# Patient Record
Sex: MALE | Race: WHITE | Employment: FULL TIME | URBAN - METROPOLITAN AREA
[De-identification: names, ages, dates, MRNs, and addresses within clinical notes are randomized per-mention and may not be internally consistent; named-entity substitution may affect disease eponyms.]

---

## 2019-05-21 ENCOUNTER — OFFICE VISIT (OUTPATIENT)
Dept: FAMILY MEDICINE CLINIC | Facility: CLINIC | Age: 24
End: 2019-05-21
Payer: COMMERCIAL

## 2019-05-21 ENCOUNTER — HOSPITAL ENCOUNTER (OUTPATIENT)
Dept: ULTRASOUND IMAGING | Facility: HOSPITAL | Age: 24
Discharge: HOME | End: 2019-05-21
Attending: NURSE PRACTITIONER
Payer: COMMERCIAL

## 2019-05-21 ENCOUNTER — LAB ENCOUNTER (OUTPATIENT)
Dept: LAB | Facility: HOSPITAL | Age: 24
End: 2019-05-21
Attending: NURSE PRACTITIONER
Payer: COMMERCIAL

## 2019-05-21 VITALS
DIASTOLIC BLOOD PRESSURE: 56 MMHG | TEMPERATURE: 98 F | OXYGEN SATURATION: 98 % | RESPIRATION RATE: 16 BRPM | HEART RATE: 64 BPM | SYSTOLIC BLOOD PRESSURE: 98 MMHG | HEIGHT: 73 IN | WEIGHT: 167 LBS | BODY MASS INDEX: 22.13 KG/M2

## 2019-05-21 DIAGNOSIS — N50.819 TESTICULAR DISCOMFORT: ICD-10-CM

## 2019-05-21 DIAGNOSIS — R59.9 GLANDS SWOLLEN: ICD-10-CM

## 2019-05-21 DIAGNOSIS — R52 BODY ACHES: ICD-10-CM

## 2019-05-21 DIAGNOSIS — K11.9 PAROTID DISCOMFORT: Primary | ICD-10-CM

## 2019-05-21 DIAGNOSIS — K11.9 PAROTID DISCOMFORT: ICD-10-CM

## 2019-05-21 PROCEDURE — 86735 MUMPS ANTIBODY: CPT

## 2019-05-21 PROCEDURE — 99203 OFFICE O/P NEW LOW 30 MIN: CPT | Performed by: NURSE PRACTITIONER

## 2019-05-21 PROCEDURE — 36415 COLL VENOUS BLD VENIPUNCTURE: CPT

## 2019-05-21 PROCEDURE — 93975 VASCULAR STUDY: CPT | Performed by: NURSE PRACTITIONER

## 2019-05-21 PROCEDURE — 76870 US EXAM SCROTUM: CPT | Performed by: NURSE PRACTITIONER

## 2019-05-21 PROCEDURE — 85025 COMPLETE CBC W/AUTO DIFF WBC: CPT

## 2019-05-21 NOTE — PROGRESS NOTES
Outgoing call to Melissa Shi, concerning pre-certification for US Scrotum.   Per Seth Degree no authorization is needed for this test,   Tracking number [id number]

## 2019-05-21 NOTE — PROGRESS NOTES
Gildardo Riley is a 26year old male. HPI:   Patient presents today to re-establish care- patient last seen in our office in 2014.  Patient reporting a 3 day history of body aches, chills, pain to his bilateral jaw- more when eating and noticing some swe 98/56   Pulse 64   Temp 98 °F (36.7 °C) (Oral)   Resp 16   Ht 73\"   Wt 167 lb   SpO2 98%   BMI 22.03 kg/m²   GENERAL: well developed, well nourished,in no apparent distress  SKIN: no rashes,no suspicious lesions  HEENT: TM clear bilaterally, nose no conge swollen  - MUMPS ANTIBODIES, IGM; Future  - MUMPS RNA (PCR),QUALITATIVE,ACUTE INFECTION; Future  - CBC WITH DIFFERENTIAL WITH PLATELET; Future    3. Testicular discomfort  STAT testicular US ordered. Ok for OTC Ibuprofen 600 mg TID with food.   - MUMPS ANT

## 2019-05-22 ENCOUNTER — APPOINTMENT (OUTPATIENT)
Dept: LAB | Facility: HOSPITAL | Age: 24
End: 2019-05-22
Attending: NURSE PRACTITIONER
Payer: COMMERCIAL

## 2019-05-22 ENCOUNTER — TELEPHONE (OUTPATIENT)
Dept: FAMILY MEDICINE CLINIC | Facility: CLINIC | Age: 24
End: 2019-05-22

## 2019-05-22 DIAGNOSIS — K11.9 PAROTID DISCOMFORT: ICD-10-CM

## 2019-05-22 DIAGNOSIS — R52 BODY ACHES: ICD-10-CM

## 2019-05-22 DIAGNOSIS — R59.9 GLANDS SWOLLEN: ICD-10-CM

## 2019-05-22 PROCEDURE — 87798 DETECT AGENT NOS DNA AMP: CPT

## 2019-05-22 NOTE — TELEPHONE ENCOUNTER
Per Latha Gilliland, pt is to have the Mumps by PCR buccal swab test re-done today. According to Jona Kinney at [facility] lab they used the wrong tube for the collection done at the out patient lab yesterday.   Called pt to advise him that he needs to re-do the te

## 2019-05-28 ENCOUNTER — TELEPHONE (OUTPATIENT)
Dept: FAMILY MEDICINE CLINIC | Facility: CLINIC | Age: 24
End: 2019-05-28

## 2019-05-28 NOTE — TELEPHONE ENCOUNTER
Pt's Mom called and states she is calling on behalf of pt regarding on what to do with his testicular pain. States pt is still with tenderness on the area, not worse or better. Pt wants to know what to do.     Spoke with Nubia Britton and she advise to d/

## 2020-03-18 ENCOUNTER — TELEPHONE (OUTPATIENT)
Dept: FAMILY MEDICINE CLINIC | Facility: CLINIC | Age: 25
End: 2020-03-18

## 2020-03-18 NOTE — TELEPHONE ENCOUNTER
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

## 2020-03-18 NOTE — TELEPHONE ENCOUNTER
Does he still have symptoms of any type or have his symptoms completely resolved? What type of work does he do?

## 2020-03-18 NOTE — TELEPHONE ENCOUNTER
If he still has cold symptoms, would recommend he stay home until symptoms are better. Once resolved, he we can write a note explaining to employer that his symptoms have resolved by his report and that he didn't meet criteria for Covid testing.

## 2020-03-18 NOTE — TELEPHONE ENCOUNTER
What symptoms is the patient experiencing?: stayed home from work past 2 days for cough and sniffles. States no fever. His work is requesting a note to return to work.       Has the patient traveled to an effected geographic area ([place], [place], [facility], [facility]

## 2020-03-20 NOTE — TELEPHONE ENCOUNTER
Pt states his symptoms are gone and he needs a note to go back to work. He said a note sent to my chart would be fine.

## 2021-11-27 ENCOUNTER — HOSPITAL ENCOUNTER (INPATIENT)
Facility: HOSPITAL | Age: 26
LOS: 5 days | Discharge: HOME | DRG: 378 | End: 2021-12-02
Attending: INTERNAL MEDICINE | Admitting: INTERNAL MEDICINE

## 2021-11-27 ENCOUNTER — APPOINTMENT (OUTPATIENT)
Dept: MRI IMAGING | Facility: HOSPITAL | Age: 26
DRG: 378 | End: 2021-11-27
Attending: INTERNAL MEDICINE

## 2021-11-27 DIAGNOSIS — K92.1 MELENA: ICD-10-CM

## 2021-11-27 PROBLEM — K52.9 GASTROENTERITIS: Status: ACTIVE | Noted: 2021-11-27

## 2021-11-27 PROCEDURE — 74181 MRI ABDOMEN W/O CONTRAST: CPT | Performed by: INTERNAL MEDICINE

## 2021-11-27 PROCEDURE — 76376 3D RENDER W/INTRP POSTPROCES: CPT | Performed by: INTERNAL MEDICINE

## 2021-11-27 RX ORDER — POTASSIUM CHLORIDE 20 MEQ/1
20 TABLET, EXTENDED RELEASE ORAL ONCE
Status: COMPLETED | OUTPATIENT
Start: 2021-11-27 | End: 2021-11-27

## 2021-11-27 RX ORDER — SODIUM CHLORIDE 9 MG/ML
INJECTION, SOLUTION INTRAVENOUS CONTINUOUS
Status: ACTIVE | OUTPATIENT
Start: 2021-11-27 | End: 2021-11-30

## 2021-11-27 RX ORDER — ONDANSETRON 2 MG/ML
4 INJECTION INTRAMUSCULAR; INTRAVENOUS EVERY 6 HOURS PRN
Status: DISCONTINUED | OUTPATIENT
Start: 2021-11-27 | End: 2021-12-02

## 2021-11-27 RX ORDER — PROCHLORPERAZINE EDISYLATE 5 MG/ML
5 INJECTION INTRAMUSCULAR; INTRAVENOUS EVERY 8 HOURS PRN
Status: DISCONTINUED | OUTPATIENT
Start: 2021-11-27 | End: 2021-12-02

## 2021-11-27 RX ORDER — ACETAMINOPHEN 325 MG/1
650 TABLET ORAL EVERY 6 HOURS PRN
Status: DISCONTINUED | OUTPATIENT
Start: 2021-11-27 | End: 2021-12-02

## 2021-11-27 RX ORDER — POTASSIUM CHLORIDE 20 MEQ/1
40 TABLET, EXTENDED RELEASE ORAL ONCE
Status: COMPLETED | OUTPATIENT
Start: 2021-11-27 | End: 2021-11-27

## 2021-11-27 NOTE — CONSULTS
[facility]    Report of Consultation    Drake Latham Patient Status:  Inpatient    1995 MRN [id number]   [facility] 3NW-A Attending Rosa Richter MD   Hosp Day # 0 PCP Connie Roque MD     Date of consult: 20 in the 24 hours ending 11/27/21 1633  Wt Readings from Last 3 Encounters:  11/27/21 : 170 lb (77.1 kg)  05/21/19 : 167 lb (75.8 kg)  06/12/14 : 142 lb (64.4 kg) (42 %, Z= -0.21)*    * Growth percentiles are based on CDC (Boys, 2-20 Years) data.     General: 11/27/2021    BACUR 1+ (A) 11/27/2021         IMAGING:     All imaging studies personally reviewed. CT ABDOMEN+PELVIS(CONTRAST ONLY)(CPT=74177)    Result Date: 11/27/2021  IMPRESSION: 1.  Probable accessory splenule as described above with an otherwise u

## 2021-11-27 NOTE — PROGRESS NOTES
1520: Assumed care of pt. Denies any CP, BASILIO, or calf pain at present. Sclera appears jaundiced. Lungs clear bilaterally. Abdomen soft, tender, nondistended. Bowel sounds present. Pt states mild nausea at this time, able to tolerate water at this time.  Voi

## 2021-11-27 NOTE — PLAN OF CARE
NURSING ADMISSION NOTE      Patient admitted via Wheelchair  Oriented to room. Safety precautions initiated. Bed in low position. Call light in reach.     Mother and pt updated on plan of care

## 2021-11-27 NOTE — PROGRESS NOTES
Consults called by dr. Simona Zuniga.  Nephrology, hemonc, and GI    Voided 725, pvr 466 (dr Cody Mahoney aware, recheck next void)

## 2021-11-27 NOTE — H&P
[facility] Hospitalist History and Physical      Chief Complaint:  N/V and diarrhea    PCP: Vesta Go MD      History of Present Illness: Patient is a 23year old male healthy male who presents w 6 day dureation of N/V and diarrhea.       Sx started Loli rhythm, S1, S2 normal, no murmur, rub or gallop appreciated   Abdomen:   Soft, non-tender. Bowel sounds normal. No masses,  No organomegaly. Non distended   Extremities: Extremities normal, atraumatic, no cyanosis or edema.    Skin: Skin color, texture, tur contrast.        Assessment/Plan:     # N/V/diarrhea  - suspect viral.  CT abd without acute process. Send GI PCR and culture. Fully vaccinated against covid and neg rapid  - IVF, CLD    # PATIENCE  - suspect pre-renal + NSAID effect.   Given concurrent thromb

## 2021-11-28 RX ORDER — POTASSIUM CHLORIDE 20 MEQ/1
40 TABLET, EXTENDED RELEASE ORAL ONCE
Status: COMPLETED | OUTPATIENT
Start: 2021-11-29 | End: 2021-11-29

## 2021-11-28 RX ORDER — POTASSIUM CHLORIDE 20 MEQ/1
40 TABLET, EXTENDED RELEASE ORAL ONCE
Status: COMPLETED | OUTPATIENT
Start: 2021-11-28 | End: 2021-11-28

## 2021-11-28 RX ORDER — POTASSIUM CHLORIDE 20 MEQ/1
40 TABLET, EXTENDED RELEASE ORAL ONCE
Status: DISCONTINUED | OUTPATIENT
Start: 2021-11-28 | End: 2021-11-28

## 2021-11-28 NOTE — PROGRESS NOTES
[facility]    Progress Note    Lucio Madden Patient Status:  Inpatient    1995 MRN [id number]   [facility] Attending Miranda Olivarez MD   [facility] Day # 1 PCP Iam Wheeler MD     Subjective:  Lucio Madden is negative.     Ecoli with HUS or TTP could be possible. But with haptoglobin and LDH elevations, this does not suggest a hemolytic picture. The good news from a liver standpoint is that his mental status is intact and his INR is normal at 1.   This all poi

## 2021-11-28 NOTE — PLAN OF CARE
A&O x4. Denies any CP, BASILIO, or calf pain at present. Sclera appears jaundiced. Lungs clear bilaterally. ST on tele. Abdomen soft, mildly tender, nondistended. Bowel sounds hypoactive but present. Pt reports flatus.  Pt reports some mild nausea but declined Progressing     Problem: DISCHARGE PLANNING  Goal: Discharge to home or other facility with appropriate resources  Description: INTERVENTIONS:  - Identify barriers to discharge w/pt and caregiver  - Include patient/family/discharge partner in discharge shanti

## 2021-11-28 NOTE — CONSULTS
Hem Onc:Consult Note          SUBJECTIVE:     Reason for Consultation: Thrombocytopenia    History of Present Illness:  Patient is a 23year old, male who presented with complaints of nausea/vomiting/diarrhea, ongoing x 6 days.  States he noted a cold init adenopathy palpable  Heart: regular rate  Lungs: clear to auscultation, no wheezing  Abdomen: soft, non-tender, non-distended  Extremity: no LE edema  Skin: No rashes or lesions  Lymph nodes: Cervical, supraclavicular,  Axillary, and inguinal nodes not pal further evaluation as clinically indicated.           Dictated by (CST): Rios Gabriel MD on 11/27/2021 at 5:00 PM       Finalized by (CST): Rios Gabriel MD on 11/27/2021 at 5:04 PM              Lab Results:   HEM:  Lab Results   Component Value Date    WBC 9.1 plan  9.  Case discussed with Dr. Jaime Angeles and Dr Rufina Ramírez

## 2021-11-28 NOTE — PROGRESS NOTES
[facility]    Nephrology Progress Note    Luis Boykin Attending:  Lo Cheung MD     Cc: PATIENCE    SUBJECTIVE     Feels a little better, n/v improved, no further diarrhea    PHYSICAL EXAM   Vital signs: /72 (BP Location: Left arm) PTP 13.5 11/27/2021    T4F 1.5 11/27/2021    TSH 0.246 11/27/2021    ESRML 48 11/27/2021    CRP 13.80 11/27/2021    MG 2.4 11/28/2021    PHOS 1.7 11/28/2021     11/28/2021       IMAGING   All imaging studies personally reviewed.     MRI ABDOMEN&MRCP W sonogram can be performed for further     evaluation as clinically indicated.               Dictated by (CST): Dipti Salinas MD on 11/27/2021 at 5:00 PM         Finalized by (CST): Dipti Salinas MD on 11/27/2021 at 5:04 PM        Murali Calvin high.   -- MRCP neg for biliary obstruction.  Plan for CARLOS      D/w Dr Keith Wu, Dr Em Hughes, Dr Lety Luna  D/w RN    Approximally > 35min of non-face to face prolonged service relating to on-going patient management as discussed above      Thank you for allowin

## 2021-11-28 NOTE — PROGRESS NOTES
[facility]     Hospitalist Progress Note     Rose Marie Lamas Patient Status:  Inpatient    1995 MRN [id number]   [facility] 3NW-A Attending Christiano Keating MD   Hosp Day # 1 PCP Batsheva Stuart MD     Chief Complaint: N/V 74  --    AST 56* 59*  --   --   --   --  55*  --    ALT 39 40  --   --   --   --  39  --    BILT 8.54* 7.9*  --   --   --   --  7.8*  --    TP 6.3* 6.4  --   --   --   --  5.5*  --     < > = values in this interval not displayed.        CrCl cannot be calc subclinical hyperthyroidism  - repeat TFTs 4 weeks    # proteinuria  - work up as per renal    # elevated CK  - from suspected virus + dehydration + nsaids  - improving w IVF  .       Plan of care discussed with Dr Bertha Kim, Dr Mirella Andrade and Dr Pascale Sandy as well as p

## 2021-11-28 NOTE — CONSULTS
[facility]    Report of E-Consultation    George Heller Patient Status:  Inpatient    1995 MRN [id number]   [facility] 3NW-A Attending Alix Cortes MD   Hosp Day # 0 PCP Stephanie Munroe MD     Date of Admission:  1 mg, 4 mg, Intravenous, Q6H PRN  •  Prochlorperazine Edisylate (COMPAZINE) injection 5 mg, 5 mg, Intravenous, Q8H PRN    Review of Systems:  GENERAL: feels well otherwise  SKIN: neg  EYES: neg  HEENT: neg  LUNGS: neg  CARDIOVASCULAR: neg  GI: as above  NEUR has a very high indirect bilirubinemia with low platelets. We are waiting on the direct bilirubin. If direct bilirubin is normal, then could he have a severe Gilbert's syndrome worsened by a viral picture and malnutrition.   [place] can reach isolated in

## 2021-11-28 NOTE — PLAN OF CARE
A&Ox4. VSS. RA. . Sclera appear slightly jaundiced. Telemetry: ST  GI: Abdomen soft, nondistended. Hypoactive, not passing gas. Some tenderness in abd  : Voids.   Pain controlled with PRN pain medications  Up with standby assist.  Diet: clears, pt to appropriate resources  Description: INTERVENTIONS:  - Identify barriers to discharge w/pt and caregiver  - Include patient/family/discharge partner in discharge planning  - Arrange for needed discharge resources and transportation as appropriate  - Identif

## 2021-11-29 ENCOUNTER — ANESTHESIA (OUTPATIENT)
Dept: ENDOSCOPY | Facility: HOSPITAL | Age: 26
DRG: 378 | End: 2021-11-29

## 2021-11-29 ENCOUNTER — ANESTHESIA EVENT (OUTPATIENT)
Dept: ENDOSCOPY | Facility: HOSPITAL | Age: 26
DRG: 378 | End: 2021-11-29

## 2021-11-29 ENCOUNTER — APPOINTMENT (OUTPATIENT)
Dept: NUCLEAR MEDICINE | Facility: HOSPITAL | Age: 26
DRG: 378 | End: 2021-11-29
Attending: INTERNAL MEDICINE

## 2021-11-29 PROCEDURE — 78226 HEPATOBILIARY SYSTEM IMAGING: CPT | Performed by: INTERNAL MEDICINE

## 2021-11-29 PROCEDURE — 99291 CRITICAL CARE FIRST HOUR: CPT | Performed by: NURSE PRACTITIONER

## 2021-11-29 PROCEDURE — 78227 HEPATOBIL SYST IMAGE W/DRUG: CPT | Performed by: INTERNAL MEDICINE

## 2021-11-29 PROCEDURE — 0DB68ZX EXCISION OF STOMACH, VIA NATURAL OR ARTIFICIAL OPENING ENDOSCOPIC, DIAGNOSTIC: ICD-10-PCS | Performed by: INTERNAL MEDICINE

## 2021-11-29 PROCEDURE — 0W3P8ZZ CONTROL BLEEDING IN GASTROINTESTINAL TRACT, VIA NATURAL OR ARTIFICIAL OPENING ENDOSCOPIC: ICD-10-PCS | Performed by: INTERNAL MEDICINE

## 2021-11-29 RX ORDER — ONDANSETRON 2 MG/ML
4 INJECTION INTRAMUSCULAR; INTRAVENOUS AS NEEDED
Status: ACTIVE | OUTPATIENT
Start: 2021-11-29 | End: 2021-11-30

## 2021-11-29 RX ORDER — CHOLECALCIFEROL (VITAMIN D3) 125 MCG
2000 CAPSULE ORAL DAILY
Status: DISCONTINUED | OUTPATIENT
Start: 2021-11-30 | End: 2021-12-02

## 2021-11-29 RX ORDER — LIDOCAINE HYDROCHLORIDE 10 MG/ML
INJECTION, SOLUTION EPIDURAL; INFILTRATION; INTRACAUDAL; PERINEURAL AS NEEDED
Status: DISCONTINUED | OUTPATIENT
Start: 2021-11-29 | End: 2021-11-29 | Stop reason: SURG

## 2021-11-29 RX ORDER — SODIUM CHLORIDE 9 MG/ML
INJECTION, SOLUTION INTRAVENOUS CONTINUOUS
Status: DISCONTINUED | OUTPATIENT
Start: 2021-11-29 | End: 2021-12-01

## 2021-11-29 RX ORDER — MAGNESIUM SULFATE HEPTAHYDRATE 40 MG/ML
2 INJECTION, SOLUTION INTRAVENOUS ONCE
Status: COMPLETED | OUTPATIENT
Start: 2021-11-29 | End: 2021-11-29

## 2021-11-29 RX ORDER — NALOXONE HYDROCHLORIDE 0.4 MG/ML
80 INJECTION, SOLUTION INTRAMUSCULAR; INTRAVENOUS; SUBCUTANEOUS AS NEEDED
Status: ACTIVE | OUTPATIENT
Start: 2021-11-29 | End: 2021-11-30

## 2021-11-29 RX ORDER — SUCRALFATE ORAL 1 G/10ML
1 SUSPENSION ORAL 3 TIMES DAILY
Status: DISCONTINUED | OUTPATIENT
Start: 2021-11-30 | End: 2021-12-02

## 2021-11-29 RX ADMIN — SODIUM CHLORIDE: 9 INJECTION, SOLUTION INTRAVENOUS at 19:42:00

## 2021-11-29 RX ADMIN — LIDOCAINE HYDROCHLORIDE 100 MG: 10 INJECTION, SOLUTION EPIDURAL; INFILTRATION; INTRACAUDAL; PERINEURAL at 19:44:00

## 2021-11-29 NOTE — PLAN OF CARE
A&Ox4. VSS. RA. . Sclera appear slightly jaundiced. Telemetry: NSR and ST at times  GI: Abdomen soft, nondistended. Hypoactive, passing some gas. Some tenderness in abd  : Voids. Pain controlled with PRN pain medications  Up ad parvez.   Diet: regular PLANNING  Goal: Discharge to home or other facility with appropriate resources  Description: INTERVENTIONS:  - Identify barriers to discharge w/pt and caregiver  - Include patient/family/discharge partner in discharge planning  - Arrange for needed dischar

## 2021-11-29 NOTE — CM/SW NOTE
Pt seen for self-pay status. Pt is self-pay at this time. Pt has a new job and applied for insurance which will not start until January of 2022.   Per pt's mother, pt was covered under their insurance but then was taken off when pt got his new job but the

## 2021-11-29 NOTE — PROGRESS NOTES
[facility]     Hospitalist Progress Note     Lucio Madden Patient Status:  Inpatient    1995 MRN [id number]   [facility] 3NW-A Attending Miranda Olivarez MD   Hosp Day # 2 PCP Iam Wheeler MD     Chief Complaint: N/V --  39  --   --   --  39   BILT 7.9*  --  7.8*  --   --   --  6.9*   TP 6.4  --  5.5*  --   --   --  4.6*    < > = values in this interval not displayed. CrCl cannot be calculated (Unknown ideal weight. ). Recent Labs   Lab 11/27/21  1455   PTP 13. follow w hydration     # low TSH, normal ft4   - consistent w subclinical hyperthyroidism  - repeat TFTs 4 weeks    # proteinuria  - work up as per renal    # elevated CK-r esolved  - from suspected virus + dehydration + nsaids  - improving w IVF  .       P

## 2021-11-29 NOTE — PROGRESS NOTES
[facility]  Progress Note    Nikki Flores Patient Status:  Inpatient    1995 MRN [id number]   [facility] 3NW-A Attending Marthenia Goodell, [facility] Gela [facility]     Subjective:  Pt had bout of coffee g 7.6 11/29/2021    ALB 1.6 11/29/2021    ALKPHO 61 11/29/2021    BILT 6.9 11/29/2021    TP 4.6 11/29/2021    AST 48 11/29/2021    ALT 39 11/29/2021    MG 1.5 11/29/2021    PHOS 1.9 11/29/2021       Assessment:     Diarrhea, nausea and vomiting  Elevated tbi

## 2021-11-29 NOTE — PROGRESS NOTES
[facility]  [facility]  Progress Note    Nikki Flores Patient Status:  Inpatient    1995 MRN [id number]   [facility] 3NW-A Attending Marthenia Goodell, MD   Hosp Day # 2 PCP Gela Jensen MD     A mmol/L    Anion Gap 9 0 - 18 mmol/L    BUN 51 (H) 7 - 18 mg/dL    Creatinine 1.81 (H) 0.70 - 1.30 mg/dL    Calcium, Total 8.3 (L) 8.5 - 10.1 mg/dL    Calculated Osmolality 288 275 - 295 mOsm/kg    GFR, Non- 51 (L) >=60    GFR, -Ameri Urine 2.0 (A) <2.0 mg/dL    Nitrite Urine Negative Negative    Leukocyte Esterase Urine Negative Negative    WBC Urine 1-5 0 - 5 /HPF    RBC Urine 0-2 0 - 2 /HPF    Bacteria Urine Rare (A) None Seen /HPF    Squamous Epi.  Cells None Seen None Seen /HPF    R - 18 mmol/L    BUN 40 (H) 7 - 18 mg/dL    Creatinine 1.51 (H) 0.70 - 1.30 mg/dL    Calcium, Total 8.1 (L) 8.5 - 10.1 mg/dL    Calculated Osmolality 286 275 - 295 mOsm/kg    GFR, Non- 63 >=60    GFR, -American 73 >=60    Albumin 1.7 ( 0. 04 0.00 - 0.20 x10(3) uL    Immature Granulocyte Absolute 0.35 0.00 - 1.00 x10(3) uL    Neutrophil % 72.8 %    Lymphocyte % 12.7 %    Monocyte % 9.2 %    Eosinophil % 1.1 %    Basophil % 0.4 %    Immature Granulocyte % 3.8 %   Scan slide    Collection Ti FERRITIN      38.0 - 270.0 ng/mL 552.6 (H)       CULTURES  No results found for this visit on 11/27/21. IMAGING:    No results found. MEDICATIONS:  Medications reviewed.     • pantoprazole (PROTONIX) IV push  40 mg Intravenous BID         ASSESSME

## 2021-11-29 NOTE — PLAN OF CARE
Patient is alert and oriented x3  Up ad parvez  Voiding  NPO status maintained for HIDA scan and EGD. Patient has an appetite. Passing Gas   Intermittent nausea today; emesis x1 overnight (coffee ground in color; per night RN). Chest burning; PPI per MAR.  E mobilization and activity  - Obtain nutritional consult as needed  - Establish a toileting routine/schedule  - Consider collaborating with pharmacy to review patient's medication profile  Outcome: Progressing        Problem: PAIN - ADULT  Goal: Verbalizes/

## 2021-11-29 NOTE — PROGRESS NOTES
[facility]    Nephrology Progress Note    Anuj Loza Attending:  Dov Henning MD       SUBJECTIVE:     Had some burning in his chest earlier this morning. GI notes reviewed - also had some coffee ground emesis per notes.   Urinating kang 11/29/2021    RDW 13.2 11/29/2021    NEPRELIM 6.71 11/29/2021    NEPERCENT 72.8 11/29/2021    LYPERCENT 12.7 11/29/2021    MOPERCENT 9.2 11/29/2021    EOPERCENT 1.1 11/29/2021    BAPERCENT 0.4 11/29/2021    NE 6.71 11/29/2021    LYMABS 1.17 11/29/2021    M hepatitis panel  -- Pending: cryo, SPEP, UTOX, bence jones  -- will repeat UPCR since Cr improved    Hyponatremia:  -- improved with IV fluids    Hypokalemia, hypomagnesemia, hypophosphatemia:  -- Kphos, magnesium sulfate replacement ordered  -- decrease I

## 2021-11-30 RX ORDER — DIPHENHYDRAMINE HCL 25 MG
25 CAPSULE ORAL EVERY 6 HOURS PRN
Status: DISCONTINUED | OUTPATIENT
Start: 2021-11-30 | End: 2021-12-02

## 2021-11-30 RX ORDER — POTASSIUM CHLORIDE 20 MEQ/1
40 TABLET, EXTENDED RELEASE ORAL ONCE
Status: COMPLETED | OUTPATIENT
Start: 2021-11-30 | End: 2021-11-30

## 2021-11-30 NOTE — BRIEF OP NOTE
Pre-Operative Diagnosis: melena     Post-Operative Diagnosis: multiple gastric ulcers x1 bleeding, esophagitis      Procedure Performed:   ESOPHAGOGASTRODUODENOSCOPY (EGD)    Surgeon(s) and Role:     * Rome Banuelos MD - Primary    Assistant(s):

## 2021-11-30 NOTE — PLAN OF CARE
Received patient around 2100. Patient transferred over to unit. Received patient on room air. Patient denies pain. Calcium gluconate delivered to unit and administered. Protonix bolus + drip initiated per orders. Urinal at bedside.  Updated patient and fami

## 2021-11-30 NOTE — PROGRESS NOTES
[facility]  Progress Note    Wilmer Cheney Patient Status:  Inpatient    1995 MRN [id number]   [facility] 4SW-A Attending Sonja Arenas MD   [facility] Day # 3 PCP Radha Jasso MD     Subjective:  No further GI bleeding 11/30/2021    CA 7.6 11/30/2021    ALB 1.7 11/30/2021    ALKPHO 63 11/30/2021    BILT 6.9 11/30/2021    TP 4.5 11/30/2021    AST 50 11/30/2021    ALT 44 11/30/2021    MG 1.2 11/30/2021    PHOS 2.8 11/30/2021     NM GB HEPATOBILIARY SCAN (CPT=78226) [[id number] unclear etiology - suspect Gilbert's syndrome in setting of prolonged fasting and elevated renal excretion of bilirubin. Hx of heavy etoh use on weekends with daily etoh use in college. Work up for underlying liver diseases thus far negative.    Platelets

## 2021-11-30 NOTE — PLAN OF CARE
Patient continuously monitored on telemetry. Medications given per [facility], tolerating PO medication and regular diet with no complications. Patient and family updated with Plan of Care and education given as needed.   Please see EPIC DocFlowsheet for further

## 2021-11-30 NOTE — PROGRESS NOTES
ICU  Critical Care APRN Progress Note    NAME: Angella Braden - ROOM: 468 - MRN: [id number] - Age: 23year old - :1995    History Of Present Illness:  Angella Braden is a 23year old male with no significant PMHx admitted to ICU post EGD.   He juanjo sec.    Pulses: 2+ and symmetric all extremities  Skin: Skin color, texture, turgor normal for ethnicity, no rashes or lesions, warm and dry  Neurologic: CNII-XII intact, normal strength    Data this admission:  CT ABDOMEN+PELVIS(CONTRAST ONLY)(CPT=74177) samples sent    2. PATIENCE- Cr improving, good UOP  -Viral and hepatitis testing negative so far  -strict I&O  -IVF  -Renal following    3. Thrombocytopenia  -Improving  -Thought to be reactive to alcohol or infection  -Heme following    4.  Hyperbilirubinemia-

## 2021-11-30 NOTE — ANESTHESIA POSTPROCEDURE EVALUATION
[address] Patient Status:  Inpatient   Age/Gender 23year old male MRN [id number]   Location [id number] [place] [address] Attending Mayuri Oden MD   [facility] Day # 2 PCP Sher Mcclelland MD       Anesthesia Post-op

## 2021-11-30 NOTE — CONSULTS
HPI: Alverto Schaffer is a 23year old male with no significant history who presented to the ER 11/27 with c/o fever, abdominal pain, vomiting, and diarrhea. He was found to have PATIENCE, thrombocytopenia, and abnormal liver function tests.  Yesterday he Asked        Stress Concern: Not Asked        Weight Concern: Not Asked        Special Diet: Not Asked        Back Care: Not Asked        Exercise: Yes          daily        Bike Helmet: Not Asked        Seat Belt: Not Asked        Self-Exams: Not Asked Labs   Lab 11/27/21  0838 11/27/21  1455 11/28/21  0748 11/28/21  1145 11/28/21  1953 11/29/21  0628 11/30/21  0447   *   < > 132*  132*   < > 133* 137 137   K 2.81*   < > 3.1*  3.1*   < > 2.7* 3.3* 3.4*   CL 84*   < > 99  99   < > 98 103 103   CO2 2 plt  6. Nutrition - clear liquid diet, advance as tolerated  7. Proph - SCD, PPI  8. Dispo   -full code  -ICU - transfer to floor when ok with GI    We will follow while the patient is in the ICU, then as needed. Steve Honeycutt M.D.   Pulmonary/Criti

## 2021-11-30 NOTE — OPERATIVE REPORT
PATIENT NAME: Shavon Guevara  MRN: [id number]  DATE OF OPERATION:  11/29/21  REFERRING PHYSICIAN: Dr. Ferny Anthony  Medications:  MAC  PREOPERATIVE DIAGNOSIS    Melena   Coffee ground emesis  POSTOPERATIVE DIAGNOSIS:  1. LA grade C esophagitis with non-bridg The procedure was completed. The patient tolerated the procedure well. RECOMMENDATIONS   1. Protonix 80 mg IV bolus to be followed by 8 mg iv per hour drip. 2. Carafate 1 gm po tid. 3. Clear liquid diet. 4. Transfer pt to ICU for close monitoring.   5

## 2021-11-30 NOTE — ANESTHESIA PREPROCEDURE EVALUATION
PRE-OP EVALUATION    Patient Name: Junior Mckeon    Admit Diagnosis: PATIENCE/DEHYDRATION/HYPONATREMIA  Gastroenteritis    Pre-op Diagnosis: melena    ESOPHAGOGASTRODUODENOSCOPY (EGD)    Anesthesia Procedure: ESOPHAGOGASTRODUODENOSCOPY (EGD) (N/A )    Mary May chloride (K-DUR M20) CR tab 40 mEq, 40 mEq, Oral, Once        Outpatient Medications:   No medications prior to admission. Allergies: Patient has no known allergies. Anesthesia Evaluation    Patient summary reviewed.     Anesthetic Complications Component Value Date    INR 1.01 11/27/2021         Airway      Mallampati: II  Mouth opening: >3 FB  TM distance: 4 - 6 cm  Neck ROM: full Cardiovascular      Rhythm: regular       Dental             Pulmonary      Breath sounds clear to auscultation bi

## 2021-11-30 NOTE — PROGRESS NOTES
[facility]    Nephrology Progress Note    Gildardo Riley Attending:  Mayuri Oden MD       SUBJECTIVE:     EGD with gastric ulcers and esophagitis. Feels a little better today. No urinary complaints or leg swelling.   Tolerating oral intak NEPRELIM 6.83 11/30/2021    NEPERCENT 64.3 11/30/2021    LYPERCENT 19.7 11/30/2021    MOPERCENT 9.4 11/30/2021    EOPERCENT 2.1 11/30/2021    BAPERCENT 0.6 11/30/2021    NE 6.83 11/30/2021    LYMABS 2.09 11/30/2021    MOABSO 1.00 11/30/2021    EOABSO 0.22 I/Os     Proteinuria: UPCR 2.59 g/g, UACR 1.4 g/g, UA 11/28 bland  -- negative: resp viral panel, covid, PLA2R, HIV, C3, C4, dsDNA, ROHINI, CMV IgM, EBV, acute hepatitis panel  -- Pending: cryo, SPEP, UTOX, bence jones  -- will repeat UPCR since Cr improved

## 2021-12-01 RX ORDER — MAGNESIUM SULFATE HEPTAHYDRATE 40 MG/ML
2 INJECTION, SOLUTION INTRAVENOUS ONCE
Status: COMPLETED | OUTPATIENT
Start: 2021-12-01 | End: 2021-12-01

## 2021-12-01 RX ORDER — MAGNESIUM OXIDE 400 MG (241.3 MG MAGNESIUM) TABLET
800 TABLET ONCE
Status: DISCONTINUED | OUTPATIENT
Start: 2021-12-01 | End: 2021-12-01

## 2021-12-01 RX ORDER — PANTOPRAZOLE SODIUM 40 MG/1
40 TABLET, DELAYED RELEASE ORAL
Status: DISCONTINUED | OUTPATIENT
Start: 2021-12-02 | End: 2021-12-02

## 2021-12-01 NOTE — PROGRESS NOTES
[facility]  Progress Note    Gildardo Riley Patient Status:  Inpatient    1995 MRN [id number]   [facility] 4SW-A Attending Mayuri Oden MD   [facility] Day # 4 PCP Sher Mcclelland MD     Subjective: Tolerating diet.   No fu BUN 11 12/01/2021     12/01/2021    K 3.5 12/01/2021     12/01/2021    CO2 28.0 12/01/2021    GLU 93 12/01/2021    CA 7.8 12/01/2021    ALB 1.8 12/01/2021    ALKPHO 79 12/01/2021    BILT 6.9 12/01/2021    TP 4.9 12/01/2021    AST 56 12/01/2021

## 2021-12-01 NOTE — PROGRESS NOTES
[facility]    Nephrology Progress Note    Nick Amaya Attending:  Virgilio Muniz MD       SUBJECTIVE:     No urinary complaints, cp, sob, leg swelling. Eating ok. PHYSICAL EXAM:     Vital Signs: /61   Pulse 79   Temp 98.9 °F (37. 12/01/2021    NEUT 82 12/01/2021    LYMPH 13 12/01/2021    MON 4 12/01/2021    EOS 1 12/01/2021    BASO 0 12/01/2021    NEPERCENT 64.3 11/30/2021    LYPERCENT 19.7 11/30/2021    MOPERCENT 9.4 11/30/2021    EOPERCENT 2.1 11/30/2021    BAPERCENT 0.6 11/30/20 pre-renal component  -- creatinine improved to 0.76 mg/dl.  hold further IV fluids and encourage oral intake  -- avoid NSAIDs, IV contrast, fleets  -- strict I/Os     Proteinuria: UPCR 2.59 g/g, UACR 1.4 g/g, UA 11/28 bland  -- negative: resp viral panel, c

## 2021-12-01 NOTE — PROGRESS NOTES
Received AO x4. On RA maintaining sats. SR on the monitor BP stable. On Protonix drip. No bleeding, no bloody stool overnight. Ambulated to the bathroom multiple times. Voided freely. Hgb Q 8hrs 8.8/8. 3 this AM  Awaiting for transfer to floor.

## 2021-12-01 NOTE — PROGRESS NOTES
[facility]  [facility]  Progress Note    Aldean Diss Patient Status:  Inpatient    1995 MRN [id number]   [facility] 3NW-A Attending Gavi Bucio MD   [facility] Day # 4 PCP José Miguel Mendoza MD     A Phosphatase 79 45 - 117 U/L    Bilirubin, Total 6.9 (H) 0.1 - 2.0 mg/dL    Total Protein 4.9 (L) 6.4 - 8.2 g/dL    Albumin 1.8 (L) 3.4 - 5.0 g/dL    Globulin  3.1 2.8 - 4.4 g/dL    A/G Ratio 0.6 (L) 1.0 - 2.0   Phosphorus    Collection Time: 12/01/21  4:52 in this interval not displayed.      Lab Results   Component Value Date    .0 (L) 12/01/2021    PLT 79.0 (L) 11/30/2021    PLT 56.0 (L) 11/29/2021    PLT 39.0 (L) 11/28/2021    PLT 37.0 (L) 11/27/2021    PLT 38 (L) 11/27/2021    .0 05/21/2019

## 2021-12-01 NOTE — PROGRESS NOTES
[facility]     Hospitalist Progress Note     Rose Marie Lamas Patient Status:  Inpatient    1995 MRN [id number]   [facility] 3NW-A Attending Christiano Keating MD   [address] PCP Batsheva Stuart MD     Chief Complaint: N/V weight. ).     Recent Labs   Lab 11/27/21  1455   PTP 13.5   INR 1.01            COVID-19 Lab Results    COVID-19  Lab Results   Component Value Date    COVID19 Not Detected 11/28/2021    COVID19 NOT DETECTED 11/27/2021       Pro-Calcitonin  No results for i subclinical hyperthyroidism  - repeat TFTs 4 weeks    # proteinuria  - work up as per renal    # elevated CK-r esolved  - from suspected virus + dehydration + nsaids  - improving w IVF  .       Hopefully home tomorrow    Parish Moreland MD    Supplemen

## 2021-12-01 NOTE — PLAN OF CARE
Assumed care of pt after shift change. Alert. Room air. VSS. Hemoglobin stable. Tolerating general diet. Independent. Denies pain. Possible discharge tomorrow AM. Family at bedside. Pt and family updated and agreeable to plan of care.  See MAR and flowsheet

## 2021-12-01 NOTE — PROGRESS NOTES
S: Pt has no complaints. He denies nausea, vomiting, abdominal pain, dyspnea.      Meds:  • cholecalciferol  2,000 Units Oral Daily   • sucralfate  1 g Oral TID       Prn Meds:  diphenhydrAMINE, acetaminophen, ondansetron, prochlorperazine    Infusi 11/28/21  0748   * 477*     Recent Labs   Lab 11/27/21  0838 11/27/21  1455   KAITY  --  552.6*   *  --    CRP  --  13.80*       Imaging reviewed    Assessment and Plan    1. GI bleeding - EGD 11/29 showed bleeding gastric ulcers  -PPI  -GI is f

## 2021-12-02 VITALS
WEIGHT: 178.56 LBS | HEART RATE: 68 BPM | OXYGEN SATURATION: 99 % | RESPIRATION RATE: 17 BRPM | BODY MASS INDEX: 24 KG/M2 | SYSTOLIC BLOOD PRESSURE: 127 MMHG | DIASTOLIC BLOOD PRESSURE: 68 MMHG | TEMPERATURE: 99 F

## 2021-12-02 RX ORDER — SUCRALFATE 1 G/1
1 TABLET ORAL
Qty: 42 TABLET | Refills: 0 | Status: SHIPPED | OUTPATIENT
Start: 2021-12-02 | End: 2021-12-06 | Stop reason: ALTCHOICE

## 2021-12-02 RX ORDER — PANTOPRAZOLE SODIUM 40 MG/1
40 TABLET, DELAYED RELEASE ORAL
Qty: 60 TABLET | Refills: 1 | Status: SHIPPED | OUTPATIENT
Start: 2021-12-02 | End: 2021-12-06 | Stop reason: ALTCHOICE

## 2021-12-02 RX ORDER — SUCRALFATE 1 G/1
1 TABLET ORAL
Status: DISCONTINUED | OUTPATIENT
Start: 2021-12-02 | End: 2021-12-02

## 2021-12-02 RX ORDER — MAGNESIUM OXIDE 400 MG (241.3 MG MAGNESIUM) TABLET
800 TABLET ONCE
Status: COMPLETED | OUTPATIENT
Start: 2021-12-02 | End: 2021-12-02

## 2021-12-02 NOTE — PROGRESS NOTES
[facility]  Progress Note    Wilmer Cheney Patient Status:  Inpatient    1995 MRN [id number]   [facility] 4NW-A Attending Sonja Arenas MD   [address] PCP Radha Jasso MD     Subjective:  Feels well - at [facility] 12/02/2021    ALB 1.9 12/02/2021    MG 1.4 12/02/2021    PHOS 2.7 12/02/2021       Assessment:     UGI bleed from gastric ulcer  Anemia from GI blood loss  Elevated tbili  Low magnesium and potassium               Hgb 8.7, tbili pending, platelets 280.

## 2021-12-02 NOTE — PLAN OF CARE
NURSING DISCHARGE NOTE    Discharged Home via Ambulatory. Accompanied by Family member  Belongings Taken by patient/family. Pt A&Ox4. Pt given Carafate before being discharged. AVS instructions explained to pt and mother at bedside.  Bili level pend

## 2021-12-02 NOTE — PLAN OF CARE
Patient A&Ox4. Pt on RA. Pt cleared for discharge. Mom at bedside.      Problem: PAIN - ADULT  Goal: Verbalizes/displays adequate comfort level or patient's stated pain goal  Description: INTERVENTIONS:  - Encourage pt to monitor pain and request assistance preferences of patient/family/discharge partner  - Complete POLST form as appropriate  - Assess patient's ability to be responsible for managing their own health  - Refer to Case Management Department for coordinating discharge planning if the patient need

## 2021-12-02 NOTE — PLAN OF CARE
Pt is a/o x4, VSS, denies pain. No c/o n/v.  Protonix drip infusing. No bleeding reported. If labs are stable, plan is for discharge tomorrow. Call light within reach.

## 2021-12-02 NOTE — PLAN OF CARE
Patient is alert and oriented. Resting in bed on room air. Low grade fever. Tylenol given. Patient up at parvez. Denies pain. Parents at bed side beginning of this shift. Plan of care discussed.  Report given to Marcell Page

## 2021-12-02 NOTE — DISCHARGE SUMMARY
General Medicine Discharge Summary     Patient ID:  Drake Latham  23year old  9/7/1995    Admit date: 11/27/2021    Discharge date and time:  12/2/21    Attending Physician: Rosa Richter MD showed persistent uptake in hepatic parenchyma- needs GI fu.  Repeat CMP as OP     # thrombocytopenia- improving  - suspect viral + possible consumption w recent bleed. Labs not consistent w TTP-HUS.   Follow  - heme consulted     # hyponatremia- IMPROVED

## 2021-12-02 NOTE — PROGRESS NOTES
Here are the biopsy/pathology findings from your recent EGD (Upper  Endoscopy): The stomach biopsies showed mild irritation without infection. Follow-up information:  Repeat the EGD in 2 - 3 months.     If you need any further assistance, please feel fr

## 2021-12-02 NOTE — PROGRESS NOTES
[facility]    Nephrology Progress Note    Wilmer Cheney Attending:  Sonja Arenas MD       SUBJECTIVE:     Feels well, ready to go home.     PHYSICAL EXAM:     Vital Signs: /68 (BP Location: Right arm)   Pulse 68   Temp 98.5 °F (36.9 ° NEUT 82 12/01/2021    LYMPH 13 12/01/2021    MON 4 12/01/2021    EOS 1 12/01/2021    BASO 0 12/01/2021    NEPERCENT 61.9 12/02/2021    LYPERCENT 21.2 12/02/2021    MOPERCENT 7.7 12/02/2021    EOPERCENT 3.7 12/02/2021    BAPERCENT 0.6 12/02/2021    NE 6.17 1.4 g/g, UA 11/28 bland  -- negative: resp viral panel, covid, PLA2R, HIV, C3, C4, dsDNA, ROHINI, CMV IgM, EBV, acute hepatitis panel, bence roque protein  -- Pending: cryo, SPEP  -- UTOX + canniboids  -- repeat UPCR was 0.32 g/g since creatinine improved; in

## 2021-12-03 ENCOUNTER — PATIENT OUTREACH (OUTPATIENT)
Dept: CASE MANAGEMENT | Age: 26
End: 2021-12-03

## 2021-12-03 NOTE — PROGRESS NOTES
Initial Post Discharge Follow Up   Discharge Date: 12/2/21  Contact Date: 12/3/2021    Consent Verification:  Assessment Completed With: Patient  HIPAA Verified?   Yes    Discharge Dx:     N/V/diarrhea- resolved  PATIENCE- resolved  ABLA  bleeding gastric ulc

## 2021-12-06 ENCOUNTER — OFFICE VISIT (OUTPATIENT)
Dept: FAMILY MEDICINE CLINIC | Facility: CLINIC | Age: 26
End: 2021-12-06

## 2021-12-06 VITALS
SYSTOLIC BLOOD PRESSURE: 96 MMHG | HEART RATE: 88 BPM | HEIGHT: 73 IN | WEIGHT: 175 LBS | DIASTOLIC BLOOD PRESSURE: 58 MMHG | TEMPERATURE: 98 F | RESPIRATION RATE: 16 BRPM | BODY MASS INDEX: 23.19 KG/M2

## 2021-12-06 DIAGNOSIS — K92.0 GASTROINTESTINAL HEMORRHAGE WITH HEMATEMESIS: ICD-10-CM

## 2021-12-06 DIAGNOSIS — R17 ELEVATED BILIRUBIN: ICD-10-CM

## 2021-12-06 DIAGNOSIS — R79.89 LOW TSH LEVEL: Primary | ICD-10-CM

## 2021-12-06 PROCEDURE — 3078F DIAST BP <80 MM HG: CPT | Performed by: FAMILY MEDICINE

## 2021-12-06 PROCEDURE — 3074F SYST BP LT 130 MM HG: CPT | Performed by: FAMILY MEDICINE

## 2021-12-06 PROCEDURE — 3008F BODY MASS INDEX DOCD: CPT | Performed by: FAMILY MEDICINE

## 2021-12-06 PROCEDURE — 99214 OFFICE O/P EST MOD 30 MIN: CPT | Performed by: FAMILY MEDICINE

## 2021-12-06 NOTE — PROGRESS NOTES
Nick Amaya is a 23year old male. CHIEF COMPLAINT   Follow-up hospitalization for GI bleed  HPI:   Follow-up hospitalization for dehydration/GI bleed. Had black stools, N/V/D - admitted on 11/27/21. EGD planned in about 2 months.    Now on pepcid Oral Tab Take 1 tablet (40 mg total) by mouth 2 (two) times daily. 60 tablet 3      No past medical history on file.    Social History:  Social History    Tobacco Use      Smoking status: Never Smoker      Smokeless tobacco: Never Used    Vaping Use      Va

## 2021-12-17 ENCOUNTER — TELEPHONE (OUTPATIENT)
Dept: FAMILY MEDICINE CLINIC | Facility: CLINIC | Age: 26
End: 2021-12-17

## 2021-12-17 NOTE — TELEPHONE ENCOUNTER
Noted. \"Expected date\" on labs changed to 12/17/21 on TSH/Free T4, T3, CMP, and CMP. Karin Moraes notified, advised of above and that Genene Ziploop is agreeable to him completing labs this weekend.

## 2021-12-17 NOTE — TELEPHONE ENCOUNTER
Due to insurance not starting until 1/1/2022, Jordy Rainey told patient he could get his labs done after first of the year.     Due to his continued symptoms of nausea, itching over body and hair falling out, patient is wondering if he

## 2021-12-17 NOTE — TELEPHONE ENCOUNTER
I'm fine with him doing them early. Will need to change the expected date so he can do them this weekend.

## 2021-12-18 ENCOUNTER — LAB ENCOUNTER (OUTPATIENT)
Dept: LAB | Facility: HOSPITAL | Age: 26
End: 2021-12-18
Attending: FAMILY MEDICINE

## 2021-12-18 ENCOUNTER — TELEPHONE (OUTPATIENT)
Dept: FAMILY MEDICINE CLINIC | Facility: CLINIC | Age: 26
End: 2021-12-18

## 2021-12-18 DIAGNOSIS — R17 ELEVATED BILIRUBIN: ICD-10-CM

## 2021-12-18 DIAGNOSIS — R79.89 LOW TSH LEVEL: ICD-10-CM

## 2021-12-18 DIAGNOSIS — K92.0 GASTROINTESTINAL HEMORRHAGE WITH HEMATEMESIS: ICD-10-CM

## 2021-12-18 PROCEDURE — 80053 COMPREHEN METABOLIC PANEL: CPT

## 2021-12-18 PROCEDURE — 85025 COMPLETE CBC W/AUTO DIFF WBC: CPT

## 2021-12-18 PROCEDURE — 84480 ASSAY TRIIODOTHYRONINE (T3): CPT

## 2021-12-18 PROCEDURE — 84439 ASSAY OF FREE THYROXINE: CPT

## 2021-12-18 PROCEDURE — 84443 ASSAY THYROID STIM HORMONE: CPT

## 2021-12-18 PROCEDURE — 36415 COLL VENOUS BLD VENIPUNCTURE: CPT

## 2021-12-18 NOTE — TELEPHONE ENCOUNTER
Paged by mom, Peter Cool, who placed patient on speaker phone. Patient continues to have nausea and today dry heaves. Hemoglobin has improved, ALT and Alk phos elevated. Bili normal. Normal renal function.   Mom wondering what to do today with nausea and dry

## 2022-01-05 ENCOUNTER — HOSPITAL ENCOUNTER (EMERGENCY)
Facility: HOSPITAL | Age: 27
Discharge: HOME | End: 2022-01-05
Attending: EMERGENCY MEDICINE
Payer: COMMERCIAL

## 2022-01-05 ENCOUNTER — TELEPHONE (OUTPATIENT)
Dept: FAMILY MEDICINE CLINIC | Facility: CLINIC | Age: 27
End: 2022-01-05

## 2022-01-05 VITALS
HEART RATE: 96 BPM | BODY MASS INDEX: 23.03 KG/M2 | WEIGHT: 170 LBS | OXYGEN SATURATION: 99 % | HEIGHT: 72 IN | RESPIRATION RATE: 18 BRPM | DIASTOLIC BLOOD PRESSURE: 88 MMHG | SYSTOLIC BLOOD PRESSURE: 144 MMHG | TEMPERATURE: 98 F

## 2022-01-05 DIAGNOSIS — K27.9 PUD (PEPTIC ULCER DISEASE): Primary | ICD-10-CM

## 2022-01-05 LAB
ALBUMIN SERPL-MCNC: 3.3 G/DL (ref 3.4–5)
ALBUMIN/GLOB SERPL: 0.7 {RATIO} (ref 1–2)
ALP LIVER SERPL-CCNC: 128 U/L
ALT SERPL-CCNC: 44 U/L
ANION GAP SERPL CALC-SCNC: 5 MMOL/L (ref 0–18)
AST SERPL-CCNC: 33 U/L (ref 15–37)
BASOPHILS # BLD AUTO: 0.08 X10(3) UL (ref 0–0.2)
BASOPHILS NFR BLD AUTO: 0.9 %
BILIRUB SERPL-MCNC: 0.7 MG/DL (ref 0.1–2)
BUN BLD-MCNC: 17 MG/DL (ref 7–18)
CALCIUM BLD-MCNC: 9 MG/DL (ref 8.5–10.1)
CHLORIDE SERPL-SCNC: 106 MMOL/L (ref 98–112)
CO2 SERPL-SCNC: 28 MMOL/L (ref 21–32)
CREAT BLD-MCNC: 1.4 MG/DL
EOSINOPHIL # BLD AUTO: 0.11 X10(3) UL (ref 0–0.7)
EOSINOPHIL NFR BLD AUTO: 1.2 %
ERYTHROCYTE [DISTWIDTH] IN BLOOD BY AUTOMATED COUNT: 12.5 %
GLOBULIN PLAS-MCNC: 4.6 G/DL (ref 2.8–4.4)
GLUCOSE BLD-MCNC: 100 MG/DL (ref 70–99)
HCT VFR BLD AUTO: 30 %
HGB BLD-MCNC: 10.3 G/DL
IMM GRANULOCYTES # BLD AUTO: 0.03 X10(3) UL (ref 0–1)
IMM GRANULOCYTES NFR BLD: 0.3 %
LIPASE SERPL-CCNC: 63 U/L (ref 73–393)
LYMPHOCYTES # BLD AUTO: 1.95 X10(3) UL (ref 1–4)
LYMPHOCYTES NFR BLD AUTO: 20.8 %
MCH RBC QN AUTO: 29.5 PG (ref 26–34)
MCHC RBC AUTO-ENTMCNC: 34.3 G/DL (ref 31–37)
MCV RBC AUTO: 86 FL
MONOCYTES # BLD AUTO: 0.45 X10(3) UL (ref 0.1–1)
MONOCYTES NFR BLD AUTO: 4.8 %
NEUTROPHILS # BLD AUTO: 6.76 X10 (3) UL (ref 1.5–7.7)
NEUTROPHILS # BLD AUTO: 6.76 X10(3) UL (ref 1.5–7.7)
NEUTROPHILS NFR BLD AUTO: 72 %
OSMOLALITY SERPL CALC.SUM OF ELEC: 290 MOSM/KG (ref 275–295)
PLATELET # BLD AUTO: 408 10(3)UL (ref 150–450)
POTASSIUM SERPL-SCNC: 3.8 MMOL/L (ref 3.5–5.1)
PROT SERPL-MCNC: 7.9 G/DL (ref 6.4–8.2)
RBC # BLD AUTO: 3.49 X10(6)UL
SODIUM SERPL-SCNC: 139 MMOL/L (ref 136–145)
WBC # BLD AUTO: 9.4 X10(3) UL (ref 4–11)

## 2022-01-05 PROCEDURE — 83690 ASSAY OF LIPASE: CPT | Performed by: PHYSICIAN ASSISTANT

## 2022-01-05 PROCEDURE — 99284 EMERGENCY DEPT VISIT MOD MDM: CPT

## 2022-01-05 PROCEDURE — 96360 HYDRATION IV INFUSION INIT: CPT

## 2022-01-05 PROCEDURE — S0028 INJECTION, FAMOTIDINE, 20 MG: HCPCS | Performed by: PHYSICIAN ASSISTANT

## 2022-01-05 PROCEDURE — 85025 COMPLETE CBC W/AUTO DIFF WBC: CPT | Performed by: PHYSICIAN ASSISTANT

## 2022-01-05 PROCEDURE — 80053 COMPREHEN METABOLIC PANEL: CPT | Performed by: PHYSICIAN ASSISTANT

## 2022-01-05 RX ORDER — FAMOTIDINE 10 MG/ML
20 INJECTION, SOLUTION INTRAVENOUS ONCE
Status: DISCONTINUED | OUTPATIENT
Start: 2022-01-05 | End: 2022-01-05

## 2022-01-05 RX ORDER — OMEPRAZOLE 40 MG/1
40 CAPSULE, DELAYED RELEASE ORAL 2 TIMES DAILY
Qty: 28 CAPSULE | Refills: 0 | Status: SHIPPED | OUTPATIENT
Start: 2022-01-05 | End: 2022-01-19

## 2022-01-05 NOTE — TELEPHONE ENCOUNTER
Since patient has a heart palpitation and blood pressure high I think we would like him to get evaluated for his symptoms. I would suggest to go to ER for evaluation.   Thank you

## 2022-01-05 NOTE — ED INITIAL ASSESSMENT (HPI)
Pt to ed for c.o right side mid abd pain that began yesterday. pcp sent pt here for eval. Pt was seen a month ago inpatient for gastric bleeing ulcer. Pt states it feels somewhat close to that. Denies n/v/d.  Pt took zofran prior to arrival. No problems wit

## 2022-01-05 NOTE — TELEPHONE ENCOUNTER
1. What are your symptoms? Pt has BP of 175/95,  Heart rate 158,  \"sharp heavy heart pain\"    2. How long have you been having these symptoms? Began 1/2 hour ago    3. Have you done anything already to treat your symptoms?      N/A    ADDITIONAL IN

## 2022-01-05 NOTE — TELEPHONE ENCOUNTER
Call to pt-he sts \"feeling a lot better, think it may all be stomach related. \" advised of dr Daylin Gurrola call and dr Georgina Mcdonough recommendation for ER evaluation and explained some gi and heart symptoms can be similar and need evaluation to better deter

## 2022-01-05 NOTE — TELEPHONE ENCOUNTER
80 Live call received in triage-melania/mom-on hipaa-and pt on call. Reporting symptoms noted below-onset approx 1230 today-no ill symptoms prior to that time.    sts BP now 149/95, , has palpitations, initially sts has sharp left sided chest pain/pr

## 2022-01-06 ENCOUNTER — TELEPHONE (OUTPATIENT)
Dept: FAMILY MEDICINE CLINIC | Facility: CLINIC | Age: 27
End: 2022-01-06

## 2022-01-06 NOTE — TELEPHONE ENCOUNTER
Patient was seen in the emergency department yesterday for peptic ulcer disease. His blood work was off slightly. Please have him make an appointment to be seen in our office on Monday or Tuesday of next week.   Have the appointment be with Cole Peers or manule

## 2022-01-06 NOTE — ED PROVIDER NOTES
Patient Seen in: [facility] Emergency Department      History   Patient presents with:  Abdomen/Flank Pain    Stated Complaint: abd pain since last night    Subjective:   HPI    Lyndsey Biswas is a 43-year-old male who presents today for evaluation of abdom Current:/88   Pulse 96   Temp 98.1 °F (36.7 °C) (Oral)   Resp 18   Ht 182.9 cm (6')   Wt 77.1 kg   SpO2 99%   BMI 23.06 kg/m²         Physical Exam  Nursing note reviewed. Vital signs reviewed.   Constitutional: Oriented to person, place, and ti Patient is brought back to the exam room. Nursing staff obtains medical history vital signs. The patient is evaluated. Labs are drawn and IV line is established. Patient is nontoxic in appearance without obvious pallor.   Mild tenderness palp R-0

## 2022-01-06 NOTE — TELEPHONE ENCOUNTER
S/W pt. He was in the ER yesterday with stomach pain secondary to PUD. I informed him his labs are a little off and Dr. Anne Sutherland would like him seen in the office for a follow up. Pt stated \" I already have an appt on 01/11/2022 with Erick Sanderson.  \" I co

## 2022-01-11 ENCOUNTER — OFFICE VISIT (OUTPATIENT)
Dept: FAMILY MEDICINE CLINIC | Facility: CLINIC | Age: 27
End: 2022-01-11
Payer: COMMERCIAL

## 2022-01-11 VITALS
BODY MASS INDEX: 23.84 KG/M2 | SYSTOLIC BLOOD PRESSURE: 118 MMHG | HEART RATE: 85 BPM | OXYGEN SATURATION: 98 % | DIASTOLIC BLOOD PRESSURE: 70 MMHG | HEIGHT: 72 IN | TEMPERATURE: 97 F | WEIGHT: 176 LBS | RESPIRATION RATE: 16 BRPM

## 2022-01-11 DIAGNOSIS — L65.9 HAIR THINNING: ICD-10-CM

## 2022-01-11 DIAGNOSIS — K92.0 GASTROINTESTINAL HEMORRHAGE WITH HEMATEMESIS: Primary | ICD-10-CM

## 2022-01-11 PROBLEM — K52.9 GASTROENTERITIS: Status: RESOLVED | Noted: 2021-11-27 | Resolved: 2022-01-11

## 2022-01-11 PROBLEM — Z87.11 HISTORY OF GASTRIC ULCER: Status: ACTIVE | Noted: 2022-01-11

## 2022-01-11 PROCEDURE — 3074F SYST BP LT 130 MM HG: CPT | Performed by: FAMILY MEDICINE

## 2022-01-11 PROCEDURE — 3008F BODY MASS INDEX DOCD: CPT | Performed by: FAMILY MEDICINE

## 2022-01-11 PROCEDURE — 99214 OFFICE O/P EST MOD 30 MIN: CPT | Performed by: FAMILY MEDICINE

## 2022-01-11 PROCEDURE — 3078F DIAST BP <80 MM HG: CPT | Performed by: FAMILY MEDICINE

## 2022-01-11 NOTE — PROGRESS NOTES
Kvng Huerta is a 23year old male. CHIEF COMPLAINT   Follow up on GI bleed  HPI:   Hair thinning has improved. Chest pain and RUQ pain have resolved after changing med and adjusting diet. Feels he may have had a panic attack prior to going to ER. Smoking status: Never Smoker      Smokeless tobacco: Never Used    Vaping Use      Vaping Use: Never used    Alcohol use: Yes      Comment: socially    Drug use: Not Currently      Comment: Marijuana occasional       REVIEW OF SYSTEMS:   GENERAL HEALTH: fe

## 2022-01-13 ENCOUNTER — LAB ENCOUNTER (OUTPATIENT)
Dept: LAB | Facility: HOSPITAL | Age: 27
End: 2022-01-13
Attending: INTERNAL MEDICINE
Payer: COMMERCIAL

## 2022-01-13 DIAGNOSIS — D62 ACUTE BLOOD LOSS ANEMIA: ICD-10-CM

## 2022-01-13 DIAGNOSIS — K25.9 MULTIPLE GASTRIC ULCERS: ICD-10-CM

## 2022-01-13 LAB
BASOPHILS # BLD AUTO: 0.08 X10(3) UL (ref 0–0.2)
BASOPHILS NFR BLD AUTO: 1.4 %
DEPRECATED HBV CORE AB SER IA-ACNC: 85.3 NG/ML
EOSINOPHIL # BLD AUTO: 0.24 X10(3) UL (ref 0–0.7)
EOSINOPHIL NFR BLD AUTO: 4.3 %
ERYTHROCYTE [DISTWIDTH] IN BLOOD BY AUTOMATED COUNT: 12.8 %
HCT VFR BLD AUTO: 33 %
HGB BLD-MCNC: 11.2 G/DL
IMM GRANULOCYTES # BLD AUTO: 0.01 X10(3) UL (ref 0–1)
IMM GRANULOCYTES NFR BLD: 0.2 %
IRON SATN MFR SERPL: 27 %
IRON SERPL-MCNC: 98 UG/DL
LYMPHOCYTES # BLD AUTO: 1.82 X10(3) UL (ref 1–4)
LYMPHOCYTES NFR BLD AUTO: 32.5 %
MCH RBC QN AUTO: 29.3 PG (ref 26–34)
MCHC RBC AUTO-ENTMCNC: 33.9 G/DL (ref 31–37)
MCV RBC AUTO: 86.4 FL
MONOCYTES # BLD AUTO: 0.41 X10(3) UL (ref 0.1–1)
MONOCYTES NFR BLD AUTO: 7.3 %
NEUTROPHILS # BLD AUTO: 3.04 X10 (3) UL (ref 1.5–7.7)
NEUTROPHILS # BLD AUTO: 3.04 X10(3) UL (ref 1.5–7.7)
NEUTROPHILS NFR BLD AUTO: 54.3 %
PLATELET # BLD AUTO: 271 10(3)UL (ref 150–450)
RBC # BLD AUTO: 3.82 X10(6)UL
TIBC SERPL-MCNC: 368 UG/DL (ref 240–450)
TRANSFERRIN SERPL-MCNC: 247 MG/DL (ref 200–360)
WBC # BLD AUTO: 5.6 X10(3) UL (ref 4–11)

## 2022-01-13 PROCEDURE — 83540 ASSAY OF IRON: CPT

## 2022-01-13 PROCEDURE — 82728 ASSAY OF FERRITIN: CPT

## 2022-01-13 PROCEDURE — 85025 COMPLETE CBC W/AUTO DIFF WBC: CPT

## 2022-01-13 PROCEDURE — 36415 COLL VENOUS BLD VENIPUNCTURE: CPT

## 2022-01-13 PROCEDURE — 83550 IRON BINDING TEST: CPT

## 2022-01-14 NOTE — PROGRESS NOTES
Here are the results from your recent testing : The iron studies were normal.  The complete blood count is improved with the hemoglobin 11.2, increased from 10.3.     Continue oral iron supplementation until the hemoglobin is normal.  Repeat the complete b

## 2022-02-08 ENCOUNTER — PATIENT MESSAGE (OUTPATIENT)
Dept: FAMILY MEDICINE CLINIC | Facility: CLINIC | Age: 27
End: 2022-02-08

## 2022-02-08 NOTE — TELEPHONE ENCOUNTER
Patient should be evaluated for his symptoms. We do not have openings tomorrow. If we have any cancellation he could be called in to be seen. We will see if Valentino Pool could accommodate him on Thursday or Friday. If he would  develop another episode of chest pain I would like him to proceed to emergency room for evaluation.   Thank you

## 2022-02-08 NOTE — TELEPHONE ENCOUNTER
From: Sam Hanna  To: Valentino Pool PA-C  Sent: 2/8/2022 1:01 PM CST  Subject: chest discomfort    I still have chest pain now and then. It doesn't seem to be heartburn and I have pretty much eliminated spicy foods. I tried a treadmill about a week ago and when I tried running, the pain came fast. I stopped and it took about an hour to go away. I'm still taking Prilosec. And have an appointment with MARCIANO Jordan on 2/17. Is there any chance this could be a side effect (myocarditis?) of the Novogen vaccine I got on 5/13/21 and 6/3/21? Or that something else is going on besides the ulcer? Thank you.      Virginia Arango

## 2022-02-08 NOTE — TELEPHONE ENCOUNTER
Call to pt's cell reaches identified voice mail. Per hipaa consent, left vmm req call back to triage nurse in our ofc as soon as this messg received to discuss symptoms further. Provided ofc phone hours/contact number  elena messg sent w same instructions.    **also see 1/5/22 TE**

## 2022-02-08 NOTE — TELEPHONE ENCOUNTER
Call back from pt-reports 2/2/22 decided to get on treadmill for recreation and because he has not worked out in a while. Reports within 30 seconds of being on treadmill he developed sharp 6-7/10 mid-sternal chest pain that radiated to left chest but did not radiate elsewhere. sts only stayed on treadmill for approx 90 seconds then stopped because of pain. Reports sl shortness of breath  At that time, which he attributes to not having worked out for quite a while. Denies any nausea, vomiting, weakness, lightheadedness, dizziness, chest tightness or other symptoms at that time or since. Reports pain gradually resolved on it's own over the next hour. Since that time reports he has had same sharp intermittent chest pain in same location 3-4x/day, rates as anywhere from 2-3/10 to 6-7/10 and lasting from 30-60 minutes, then again resolving on its own. sts when he wakes each day he feels well, feels laying down worsens his symptoms. sts does not seem to be heartburn, has stopped spicy foods, continues on prilosec and has GI appt w dr Niecy Rosales 2/17/22. (has hx of melena, hematemesis, GI hemorrhage, PUD)  Admits episode made him anxious \"as I never reaally got an answer as to why I would have an ulcer in the first place, then went online to investigate my symptoms, which was not a good idea. \" voices concern that symptoms could possibly be side effect/myocarditis of pfizer covid vaccine-2nd dose 6/3/21    Advised bita AGUIAR is gone for the day/out of the ofc tomorrow/returns Thursday. Will forward this info for her review as well as to our ofc provider on call. Will call back if any other recommendations. Patient voices understanding/agrees with plan/no further questions. **See above and advise-thanks!

## 2022-02-09 NOTE — TELEPHONE ENCOUNTER
Call bck from pt-confirms received voice mail messg we left yesterday w instructions from dr garnett/on call. Advised of bita AGUIAR recommendation for OV tomorrow to evaluate. Pt readily agrees, appt scheduled, reinforced if recurrence of chest pain or any new/worsening symptoms proceed to ER-do not wait for OV. Patient voices understanding/agrees with plan/no further questions.

## 2022-02-09 NOTE — TELEPHONE ENCOUNTER
Call to pt's cell reaches identified voice mail. Per hipaa consent, left detailed vmm w dr redding's comments and instructions. Advised if any urgent questions tonight, call our ofc # and follow the prompts to speak w dr Anum Michaud. If non-urgent questions, call ofc tomorrow and speak w triage nurse. will also await further input from bita AGUIAR re if she wants an ofc eval yet this wk. Will call w her recommendations.   Hold for bita mo

## 2022-02-10 ENCOUNTER — OFFICE VISIT (OUTPATIENT)
Dept: FAMILY MEDICINE CLINIC | Facility: CLINIC | Age: 27
End: 2022-02-10
Payer: COMMERCIAL

## 2022-02-10 VITALS
TEMPERATURE: 98 F | RESPIRATION RATE: 12 BRPM | DIASTOLIC BLOOD PRESSURE: 66 MMHG | BODY MASS INDEX: 24.52 KG/M2 | HEIGHT: 72 IN | HEART RATE: 64 BPM | SYSTOLIC BLOOD PRESSURE: 110 MMHG | WEIGHT: 181 LBS

## 2022-02-10 DIAGNOSIS — R07.9 CHEST PAIN, UNSPECIFIED TYPE: Primary | ICD-10-CM

## 2022-02-10 PROCEDURE — 3074F SYST BP LT 130 MM HG: CPT | Performed by: FAMILY MEDICINE

## 2022-02-10 PROCEDURE — 93000 ELECTROCARDIOGRAM COMPLETE: CPT | Performed by: FAMILY MEDICINE

## 2022-02-10 PROCEDURE — 3008F BODY MASS INDEX DOCD: CPT | Performed by: FAMILY MEDICINE

## 2022-02-10 PROCEDURE — 99214 OFFICE O/P EST MOD 30 MIN: CPT | Performed by: FAMILY MEDICINE

## 2022-02-10 PROCEDURE — 3078F DIAST BP <80 MM HG: CPT | Performed by: FAMILY MEDICINE

## 2022-02-10 RX ORDER — OMEPRAZOLE 20 MG/1
20 CAPSULE, DELAYED RELEASE ORAL
COMMUNITY

## 2022-02-10 NOTE — PATIENT INSTRUCTIONS
If unable to schedule the echocardiogram in the next week or two, please let us know and we will call to schedule.

## 2022-02-11 ENCOUNTER — TELEPHONE (OUTPATIENT)
Dept: ADMINISTRATIVE | Age: 27
End: 2022-02-11

## 2022-02-16 ENCOUNTER — HOSPITAL ENCOUNTER (OUTPATIENT)
Dept: CV DIAGNOSTICS | Facility: HOSPITAL | Age: 27
Discharge: HOME | End: 2022-02-16
Attending: FAMILY MEDICINE
Payer: COMMERCIAL

## 2022-02-16 DIAGNOSIS — R07.9 CHEST PAIN, UNSPECIFIED TYPE: ICD-10-CM

## 2022-02-16 PROCEDURE — 93306 TTE W/DOPPLER COMPLETE: CPT | Performed by: FAMILY MEDICINE

## 2022-02-19 ENCOUNTER — LAB ENCOUNTER (OUTPATIENT)
Dept: LAB | Age: 27
End: 2022-02-19
Attending: FAMILY MEDICINE
Payer: COMMERCIAL

## 2022-02-19 DIAGNOSIS — R07.9 EXERTIONAL CHEST PAIN: ICD-10-CM

## 2022-02-20 LAB — SARS-COV-2 RNA RESP QL NAA+PROBE: NOT DETECTED

## 2022-02-22 ENCOUNTER — HOSPITAL ENCOUNTER (OUTPATIENT)
Dept: CV DIAGNOSTICS | Facility: HOSPITAL | Age: 27
Discharge: HOME | End: 2022-02-22
Attending: FAMILY MEDICINE
Payer: COMMERCIAL

## 2022-02-22 DIAGNOSIS — R07.9 EXERTIONAL CHEST PAIN: ICD-10-CM

## 2022-02-22 PROCEDURE — 93017 CV STRESS TEST TRACING ONLY: CPT | Performed by: FAMILY MEDICINE

## 2022-02-22 PROCEDURE — 93018 CV STRESS TEST I&R ONLY: CPT | Performed by: FAMILY MEDICINE

## 2023-07-16 NOTE — TELEPHONE ENCOUNTER
Call to pt-advised of bita AGUIAR comments/recommendations. Patient voices understanding/agrees with plan/no further questions. I reviewed with the resident the medical history and the resident’s findings on physical examination.  I discussed with the resident the patient’s diagnosis and concur with the treatment plan as documented in the resident note.    He left the office before being seen by me.  Plans discussed in a subsequent phone call.   I'm unsure why Bps varied so widely during this visit, both in the same arm.   NO chest pain, dizziness,  or other red flag symptoms.    Lisa White MD

## 2024-03-05 ENCOUNTER — OFFICE VISIT (OUTPATIENT)
Dept: FAMILY MEDICINE CLINIC | Facility: CLINIC | Age: 29
End: 2024-03-05
Payer: COMMERCIAL

## 2024-03-05 VITALS
HEIGHT: 73.25 IN | RESPIRATION RATE: 16 BRPM | SYSTOLIC BLOOD PRESSURE: 92 MMHG | TEMPERATURE: 98 F | HEART RATE: 72 BPM | DIASTOLIC BLOOD PRESSURE: 58 MMHG | BODY MASS INDEX: 26.8 KG/M2 | WEIGHT: 204.38 LBS

## 2024-03-05 DIAGNOSIS — Z00.00 ANNUAL PHYSICAL EXAM: Primary | ICD-10-CM

## 2024-03-05 DIAGNOSIS — Z00.00 LABORATORY EXAMINATION ORDERED AS PART OF A ROUTINE GENERAL MEDICAL EXAMINATION: ICD-10-CM

## 2024-03-05 PROBLEM — K21.9 GASTROESOPHAGEAL REFLUX DISEASE WITHOUT ESOPHAGITIS: Status: ACTIVE | Noted: 2024-03-05

## 2024-03-05 PROCEDURE — 99395 PREV VISIT EST AGE 18-39: CPT | Performed by: FAMILY MEDICINE

## 2024-03-05 PROCEDURE — 3078F DIAST BP <80 MM HG: CPT | Performed by: FAMILY MEDICINE

## 2024-03-05 PROCEDURE — 3074F SYST BP LT 130 MM HG: CPT | Performed by: FAMILY MEDICINE

## 2024-03-05 PROCEDURE — 3008F BODY MASS INDEX DOCD: CPT | Performed by: FAMILY MEDICINE

## 2024-03-05 PROCEDURE — 90715 TDAP VACCINE 7 YRS/> IM: CPT | Performed by: FAMILY MEDICINE

## 2024-03-05 PROCEDURE — 90471 IMMUNIZATION ADMIN: CPT | Performed by: FAMILY MEDICINE

## 2024-03-05 RX ORDER — FAMOTIDINE 20 MG/1
TABLET, FILM COATED ORAL
Qty: 60 TABLET | Refills: 0 | Status: SHIPPED | OUTPATIENT
Start: 2024-03-05

## 2024-03-05 NOTE — PROGRESS NOTES
CHIEF COMPLAINT   Anthony Franks is a 28 year old male who presents for a complete physical exam.   HPI:   Here for physical.  Needs health screening form filled out from his employer.  GERD since ulcer but wasn't as bad as the last 6 months - more than 50% of the days.  Foods will trigger but can't control just with avoiding food triggers.    Declines STD testing.    Wt Readings from Last 6 Encounters:   03/05/24 204 lb 6.4 oz (92.7 kg)   02/17/22 182 lb (82.6 kg)   02/10/22 181 lb (82.1 kg)   01/11/22 176 lb (79.8 kg)   01/05/22 170 lb (77.1 kg)   12/06/21 175 lb (79.4 kg)     Body mass index is 26.78 kg/m².     AST (U/L)   Date Value   02/17/2022 19   01/05/2022 33   12/18/2021 36   12/02/2021 46 (H)   11/27/2021 56 (H)     ALT (U/L)   Date Value   02/17/2022 23   01/05/2022 44   12/18/2021 132 (H)   12/02/2021 66 (H)   11/27/2021 39      No current outpatient medications on file.      No Known Allergies   Past Medical History:   Diagnosis Date    Esophageal reflux     Gastric ulcer       Past Surgical History:   Procedure Laterality Date    HERNIA SURGERY  09/2002      Family History   Problem Relation Age of Onset    Alcohol and Other Disorders Associated Maternal Grandfather       Social History:  Social History     Socioeconomic History    Marital status: Single   Tobacco Use    Smoking status: Never    Smokeless tobacco: Never   Vaping Use    Vaping Use: Never used   Substance and Sexual Activity    Alcohol use: Yes     Comment: socially    Drug use: Yes     Types: Cannabis     Comment: Marijuana occasional    Sexual activity: Yes     Partners: Female   Other Topics Concern    Caffeine Concern Yes     Comment: occasional    Exercise Yes     Comment: 2-3x a week         REVIEW OF SYSTEMS:   GENERAL: feels well otherwise  SKIN: no complaint of any unusual skin lesions  EYES: no complaint of blurred vision or double vision  HEENT: no complaint of nasal congestion, sinus pain or ST  LUNGS: no complaint of  shortness of breath with exertion  CARDIOVASCULAR: no complaint of chest pain on exertion  GI: as above  : no complaint of nocturia or changes in stream  MUSCULOSKELETAL: no complaint of back pain  NEURO: no complaint of headaches  PSYCHE: no complaint ofdepression or anxiety    EXAM:   BP 92/58   Pulse 72   Temp 97.8 °F (36.6 °C) (Temporal)   Resp 16   Ht 6' 1.25\" (1.861 m)   Wt 204 lb 6.4 oz (92.7 kg)   BMI 26.78 kg/m²   Body mass index is 26.78 kg/m².   GENERAL: well developed, well nourished,in no apparent distress  SKIN: no rashes,no suspicious lesions  HEENT: atraumatic, normocephalic,ears and throat are clear  EYES: EOMI, conjunctiva are clear  NECK: supple,no adenopathy  LUNGS: clear to auscultation  CARDIO: RRR without murmur  GI: Nondistended.  Nontender. No masses.  No organomegaly.  : Two descended testes,no masses, no hernia, no penile lesions.    MUSCULOSKELETAL: no deformity  EXTREMITIES: no cyanosis, clubbing or edema  NEURO: Oriented times three,cranial nerves are grossly intact,motor and sensory are grossly intact    ASSESSMENT AND PLAN:   Anthony Franks is a 28 year old male who presents for a complete physical exam. Pt's weight is Body mass index is 26.78 kg/m²., recommended regular exercise.  The patient indicates understanding of these issues and agrees to the plan.  The patient is asked to return in 1 year for a complete physical.   Encounter Diagnoses   Name Primary?    Annual physical exam Yes    Laboratory examination ordered as part of a routine general medical examination        Orders Placed This Encounter   Procedures    CBC With Differential With Platelet    Comp Metabolic Panel (14)    Lipid Panel    TdaP (Adacel, Boostrix) [92407]       Meds & Refills for this Visit:  Requested Prescriptions     Signed Prescriptions Disp Refills    famotidine (PEPCID) 20 MG Oral Tab 60 tablet 0     Si po BID       Imaging & Consults:  TETANUS, DIPHTHERIA TOXOIDS AND ACELLULAR PERTUSIS  VACCINE (TDAP), >7 YEARS, IM USE  Patient Instructions   Once you have your blood work done, please send me a [email or web address] message to remind me that I have your paperwork to complete.    Take famotidine (generic Pepcid) twice daily for 1 month then update.  If doing well, I'll have you try to decrease to 1 pill daily.  Also update Dr. Jordan after 1 month.      Check on insurance coverage for HPV vaccine.

## 2024-03-05 NOTE — PATIENT INSTRUCTIONS
Once you have your blood work done, please send me a [employer] message to remind me that I have your paperwork to complete.    Take famotidine (generic Pepcid) twice daily for 1 month then update.  If doing well, I'll have you try to decrease to 1 pill daily.  Also update Dr. Jordan after 1 month.      Check on insurance coverage for HPV vaccine.

## 2024-03-06 ENCOUNTER — PATIENT MESSAGE (OUTPATIENT)
Dept: FAMILY MEDICINE CLINIC | Facility: CLINIC | Age: 29
End: 2024-03-06

## 2024-03-06 ENCOUNTER — LAB ENCOUNTER (OUTPATIENT)
Dept: LAB | Facility: HOSPITAL | Age: 29
End: 2024-03-06
Attending: FAMILY MEDICINE
Payer: COMMERCIAL

## 2024-03-06 DIAGNOSIS — Z00.00 LABORATORY EXAMINATION ORDERED AS PART OF A ROUTINE GENERAL MEDICAL EXAMINATION: ICD-10-CM

## 2024-03-06 LAB
ALBUMIN SERPL-MCNC: 4.2 G/DL (ref 3.4–5)
ALBUMIN/GLOB SERPL: 1.3 {RATIO} (ref 1–2)
ALP LIVER SERPL-CCNC: 88 U/L
ALT SERPL-CCNC: 25 U/L
ANION GAP SERPL CALC-SCNC: <0 MMOL/L (ref 0–18)
AST SERPL-CCNC: 17 U/L (ref 15–37)
BASOPHILS # BLD AUTO: 0.05 X10(3) UL (ref 0–0.2)
BASOPHILS NFR BLD AUTO: 0.9 %
BILIRUB SERPL-MCNC: 0.7 MG/DL (ref 0.1–2)
BUN BLD-MCNC: 14 MG/DL (ref 9–23)
CALCIUM BLD-MCNC: 9.3 MG/DL (ref 8.5–10.1)
CHLORIDE SERPL-SCNC: 108 MMOL/L (ref 98–112)
CHOLEST SERPL-MCNC: 137 MG/DL (ref ?–200)
CO2 SERPL-SCNC: 31 MMOL/L (ref 21–32)
CREAT BLD-MCNC: 0.88 MG/DL
EGFRCR SERPLBLD CKD-EPI 2021: 121 ML/MIN/1.73M2 (ref 60–?)
EOSINOPHIL # BLD AUTO: 0.21 X10(3) UL (ref 0–0.7)
EOSINOPHIL NFR BLD AUTO: 3.9 %
ERYTHROCYTE [DISTWIDTH] IN BLOOD BY AUTOMATED COUNT: 12.4 %
FASTING PATIENT LIPID ANSWER: YES
FASTING STATUS PATIENT QL REPORTED: YES
GLOBULIN PLAS-MCNC: 3.3 G/DL (ref 2.8–4.4)
GLUCOSE BLD-MCNC: 88 MG/DL (ref 70–99)
HCT VFR BLD AUTO: 44.8 %
HDLC SERPL-MCNC: 43 MG/DL (ref 40–59)
HGB BLD-MCNC: 15.2 G/DL
IMM GRANULOCYTES # BLD AUTO: 0.01 X10(3) UL (ref 0–1)
IMM GRANULOCYTES NFR BLD: 0.2 %
LDLC SERPL CALC-MCNC: 77 MG/DL (ref ?–100)
LYMPHOCYTES # BLD AUTO: 2.15 X10(3) UL (ref 1–4)
LYMPHOCYTES NFR BLD AUTO: 39.5 %
MCH RBC QN AUTO: 29.4 PG (ref 26–34)
MCHC RBC AUTO-ENTMCNC: 33.9 G/DL (ref 31–37)
MCV RBC AUTO: 86.7 FL
MONOCYTES # BLD AUTO: 0.3 X10(3) UL (ref 0.1–1)
MONOCYTES NFR BLD AUTO: 5.5 %
NEUTROPHILS # BLD AUTO: 2.72 X10 (3) UL (ref 1.5–7.7)
NEUTROPHILS # BLD AUTO: 2.72 X10(3) UL (ref 1.5–7.7)
NEUTROPHILS NFR BLD AUTO: 50 %
NONHDLC SERPL-MCNC: 94 MG/DL (ref ?–130)
OSMOLALITY SERPL CALC.SUM OF ELEC: 286 MOSM/KG (ref 275–295)
PLATELET # BLD AUTO: 239 10(3)UL (ref 150–450)
POTASSIUM SERPL-SCNC: 3.9 MMOL/L (ref 3.5–5.1)
PROT SERPL-MCNC: 7.5 G/DL (ref 6.4–8.2)
RBC # BLD AUTO: 5.17 X10(6)UL
SODIUM SERPL-SCNC: 138 MMOL/L (ref 136–145)
TRIGL SERPL-MCNC: 88 MG/DL (ref 30–149)
VLDLC SERPL CALC-MCNC: 14 MG/DL (ref 0–30)
WBC # BLD AUTO: 5.4 X10(3) UL (ref 4–11)

## 2024-03-06 PROCEDURE — 80061 LIPID PANEL: CPT

## 2024-03-06 PROCEDURE — 80053 COMPREHEN METABOLIC PANEL: CPT

## 2024-03-06 PROCEDURE — 36415 COLL VENOUS BLD VENIPUNCTURE: CPT

## 2024-03-06 PROCEDURE — 85025 COMPLETE CBC W/AUTO DIFF WBC: CPT

## 2024-03-07 NOTE — TELEPHONE ENCOUNTER
From: Anthony Franks  To: Stephanie Dressler  Sent: 3/6/2024 5:23 PM CST  Subject: Lab Test Results     Alexbhupinder Arina! I completed my lab test and I believe the results are posted. Could you please fill out the form I had from work and fax it to them?     Thanks

## 2024-04-02 NOTE — TELEPHONE ENCOUNTER
LOV: 03/05/2024    Last Refill:   Medication Quantity Refills Start End   famotidine (PEPCID) 20 MG Oral Tab 60 tablet 0 3/5/2024 --     RTC: 03/05/2025    Protocol: passed    Mcm sent to ask for update on medication.

## 2024-04-04 NOTE — TELEPHONE ENCOUNTER
Let's have him do prilosec 20mg daily for 30 days and then change back to pepcid 20mg BID. See me in 6 weeks for recheck.

## 2024-04-07 RX ORDER — FAMOTIDINE 20 MG/1
TABLET, FILM COATED ORAL
Qty: 60 TABLET | Refills: 0 | Status: CANCELLED | OUTPATIENT
Start: 2024-04-07

## 2024-04-08 NOTE — TELEPHONE ENCOUNTER
See my note from 4/4 - change to prilosec - I'm not sure if this was sent or if patient was notified of my instructions.

## 2024-04-08 NOTE — TELEPHONE ENCOUNTER
LOV: 3/5/24  Next OV: none stated  Last Refill:3/5/24      Please authorize if acceptable.   Thank you!

## 2024-04-09 RX ORDER — OMEPRAZOLE 20 MG/1
20 TABLET, DELAYED RELEASE ORAL DAILY
Refills: 0 | Status: CANCELLED | OUTPATIENT
Start: 2024-04-09

## 2024-04-09 RX ORDER — OMEPRAZOLE 20 MG/1
20 CAPSULE, DELAYED RELEASE ORAL EVERY MORNING
Qty: 30 CAPSULE | Refills: 0 | Status: SHIPPED | OUTPATIENT
Start: 2024-04-09 | End: 2024-05-09

## 2024-04-09 RX ORDER — FAMOTIDINE 20 MG/1
TABLET, FILM COATED ORAL
Qty: 60 TABLET | Refills: 0 | OUTPATIENT
Start: 2024-04-09

## 2024-04-09 NOTE — TELEPHONE ENCOUNTER
Patient notified of new recommendations.  Order pended for approval if appropriate.  Thank you.   negative No joint pain, swelling or deformity; no limitation of movement

## 2025-01-12 ENCOUNTER — PATIENT MESSAGE (OUTPATIENT)
Dept: FAMILY MEDICINE CLINIC | Facility: CLINIC | Age: 30
End: 2025-01-12

## 2025-01-15 NOTE — TELEPHONE ENCOUNTER
The majority of the blood work we do as part of your physical but the fasting insulin CRP vitamin D uric acid levels have to have a diagnosis for insurance to pay for these I would not be able to justify them without having a past medical history for them  I would suggest to try to increase your protein to at least 120 g a day as well as watching  portion control

## 2025-03-10 ENCOUNTER — LAB ENCOUNTER (OUTPATIENT)
Dept: LAB | Age: 30
End: 2025-03-10
Attending: FAMILY MEDICINE
Payer: COMMERCIAL

## 2025-03-10 ENCOUNTER — OFFICE VISIT (OUTPATIENT)
Dept: FAMILY MEDICINE CLINIC | Facility: CLINIC | Age: 30
End: 2025-03-10
Payer: COMMERCIAL

## 2025-03-10 VITALS
SYSTOLIC BLOOD PRESSURE: 102 MMHG | HEIGHT: 72 IN | DIASTOLIC BLOOD PRESSURE: 60 MMHG | TEMPERATURE: 98 F | WEIGHT: 197 LBS | BODY MASS INDEX: 26.68 KG/M2 | HEART RATE: 57 BPM | RESPIRATION RATE: 18 BRPM

## 2025-03-10 DIAGNOSIS — Z00.00 HEALTHCARE MAINTENANCE: Primary | ICD-10-CM

## 2025-03-10 DIAGNOSIS — Z00.00 HEALTHCARE MAINTENANCE: ICD-10-CM

## 2025-03-10 LAB
ALBUMIN SERPL-MCNC: 4.7 G/DL (ref 3.2–4.8)
ALBUMIN/GLOB SERPL: 2 {RATIO} (ref 1–2)
ALP LIVER SERPL-CCNC: 73 U/L
ALT SERPL-CCNC: 15 U/L
ANION GAP SERPL CALC-SCNC: 9 MMOL/L (ref 0–18)
AST SERPL-CCNC: 26 U/L (ref ?–34)
BASOPHILS # BLD AUTO: 0.04 X10(3) UL (ref 0–0.2)
BASOPHILS NFR BLD AUTO: 0.9 %
BILIRUB SERPL-MCNC: 1.5 MG/DL (ref 0.3–1.2)
BUN BLD-MCNC: 10 MG/DL (ref 9–23)
CALCIUM BLD-MCNC: 9.6 MG/DL (ref 8.7–10.6)
CHLORIDE SERPL-SCNC: 106 MMOL/L (ref 98–112)
CHOLEST SERPL-MCNC: 178 MG/DL (ref ?–200)
CO2 SERPL-SCNC: 27 MMOL/L (ref 21–32)
CREAT BLD-MCNC: 1.06 MG/DL
EGFRCR SERPLBLD CKD-EPI 2021: 98 ML/MIN/1.73M2 (ref 60–?)
EOSINOPHIL # BLD AUTO: 0.08 X10(3) UL (ref 0–0.7)
EOSINOPHIL NFR BLD AUTO: 1.8 %
ERYTHROCYTE [DISTWIDTH] IN BLOOD BY AUTOMATED COUNT: 12.3 %
FASTING PATIENT LIPID ANSWER: YES
FASTING STATUS PATIENT QL REPORTED: YES
GLOBULIN PLAS-MCNC: 2.3 G/DL (ref 2–3.5)
GLUCOSE BLD-MCNC: 79 MG/DL (ref 70–99)
HCT VFR BLD AUTO: 44.8 %
HDLC SERPL-MCNC: 46 MG/DL (ref 40–59)
HGB BLD-MCNC: 14.9 G/DL
IMM GRANULOCYTES # BLD AUTO: 0 X10(3) UL (ref 0–1)
IMM GRANULOCYTES NFR BLD: 0 %
LDLC SERPL CALC-MCNC: 122 MG/DL (ref ?–100)
LYMPHOCYTES # BLD AUTO: 1.78 X10(3) UL (ref 1–4)
LYMPHOCYTES NFR BLD AUTO: 40.3 %
MCH RBC QN AUTO: 29.3 PG (ref 26–34)
MCHC RBC AUTO-ENTMCNC: 33.3 G/DL (ref 31–37)
MCV RBC AUTO: 88 FL
MONOCYTES # BLD AUTO: 0.38 X10(3) UL (ref 0.1–1)
MONOCYTES NFR BLD AUTO: 8.6 %
NEUTROPHILS # BLD AUTO: 2.14 X10 (3) UL (ref 1.5–7.7)
NEUTROPHILS # BLD AUTO: 2.14 X10(3) UL (ref 1.5–7.7)
NEUTROPHILS NFR BLD AUTO: 48.4 %
NONHDLC SERPL-MCNC: 132 MG/DL (ref ?–130)
OSMOLALITY SERPL CALC.SUM OF ELEC: 292 MOSM/KG (ref 275–295)
PLATELET # BLD AUTO: 223 10(3)UL (ref 150–450)
POTASSIUM SERPL-SCNC: 3.7 MMOL/L (ref 3.5–5.1)
PROT SERPL-MCNC: 7 G/DL (ref 5.7–8.2)
RBC # BLD AUTO: 5.09 X10(6)UL
SODIUM SERPL-SCNC: 142 MMOL/L (ref 136–145)
TRIGL SERPL-MCNC: 53 MG/DL (ref 30–149)
TSI SER-ACNC: 1.96 UIU/ML (ref 0.55–4.78)
VLDLC SERPL CALC-MCNC: 9 MG/DL (ref 0–30)
WBC # BLD AUTO: 4.4 X10(3) UL (ref 4–11)

## 2025-03-10 PROCEDURE — 80061 LIPID PANEL: CPT | Performed by: FAMILY MEDICINE

## 2025-03-10 PROCEDURE — 80050 GENERAL HEALTH PANEL: CPT | Performed by: FAMILY MEDICINE

## 2025-03-10 NOTE — PROGRESS NOTES
[facility] Family Medicine Office Note  Chief Complaint:   Chief Complaint   Patient presents with    Physical       HPI:   This is a 29 year old male coming in for physical exam.  The patient denies any acute concerns today states that he has been doing well denies any chest pain nausea vomiting diarrhea constipation.      Past Medical History:    Esophageal reflux    Gastric ulcer     Past Surgical History:   Procedure Laterality Date    Hernia surgery  09/2002     Social History:  Social History     Socioeconomic History    Marital status: Single   Tobacco Use    Smoking status: Never    Smokeless tobacco: Never   Vaping Use    Vaping status: Never Used   Substance and Sexual Activity    Alcohol use: Yes     Comment: socially    Drug use: Yes     Types: Cannabis     Comment: Marijuana occasional    Sexual activity: Yes     Partners: Female   Other Topics Concern    Caffeine Concern Yes     Comment: occasional    Exercise Yes     Comment: 2-3x a week     Social Drivers of Health     Food Insecurity: No Food Insecurity (3/10/2025)    NCSS - Food Insecurity     Worried About Running Out of Food in the Last Year: No     Ran Out of Food in the Last Year: No   Transportation Needs: No Transportation Needs (3/10/2025)    NCSS - Transportation     Lack of Transportation: No   Housing Stability: Not At Risk (3/10/2025)    NCSS - Housing/Utilities     Has Housing: Yes     Worried About Losing Housing: No     Unable to Get Utilities: No     Family History:  Family History   Problem Relation Age of Onset    Alcohol and Other Disorders Associated Maternal Grandfather      Allergies:  Allergies[1]  Current Meds:  No current outpatient medications on file.      Counseling given: Not Answered       REVIEW OF SYSTEMS:   Consitutional: No fevers, chills, sweats  Eye: No recent visual problems  ENMT: No ear pain nasal congestion sore throat  Respiratory: No shortness of breath, cough  Cardiovascular: No chest pain  palpitations syncope  Gastrointestinal: No nausea vomiting diarrhea  Genitourinary: No hematuria  Hema/Lymph no bruising tendency, swollen lymph glands  Endocrine: Negative for excessive thirst excessive hunger  Musculoskeletal: No back pain neck pain joint pain muscle pain decreased range of motion  Integumentary: No rash, pruritus, abrasions  Neurologic: Alert and oriented x4  Psychiatric: No anxiety, depression    Medical, surgical, family, and social histories were reviewed      EXAM:   VITALS:   Vitals:    03/10/25 0932   BP: 102/60   Pulse: 57   Resp: 18   Temp: 98.4 °F (36.9 °C)      GENERAL: well developed, well nourished, in no apparent distress  SKIN: no rashes, no suspicious lesions: Cool and Dry  HEENT: atraumatic, normocephalic, ears and throat are clear    Ears: TM's clear and visible bilaterally, no excess cerumen or erythema.   EYES: Pupils equal round and reactive.  Extraocular motions intact no scleral icterus no injection or drainage  THROAT without erythema tonsillar hypertrophy or exudate.  Uvula midline airway patent  NECK: Given midline.  No JVD or lymphadenopathy supple nontender no meningeal signs   LUNGS: clear to auscultation sounds equal bilaterally no wheezes rales or rhonchi  CARDIO: Regular rate and rhythm without murmurs gallops or rubs  GI: Soft nontender nondistended no hepatomegaly palpable masses.  No guarding.   without deformity or crepitus no flank tenderness  EXTREMITIES: no cyanosis, clubbing or edema no joint tenderness effusion or edema noted.  No calf tenderness negative Homans' sign bilaterally  NEURO: Awake and alert.  Cranial nerves II through XII intact motor and sensory grossly within normal limits.  5 out of 5 muscle strength in all muscle groups.  Normal speech.  PSYCHIATRIC: Awake, alert and oriented x3, cooperative appropriate mood and affect.  Judgment intact       ASSESSMENT AND PLAN:   1. Healthcare maintenance  - CBC With Differential With Platelet;  Future  - Comp Metabolic Panel (14); Future  - Lipid Panel; Future  - TSH W Reflex To Free T4; Future  There were no acute findings on physical exam today he was asked to update his blood work needed fasting lipid panel CBC CMP TSH T3-T4 was asked to follow-up yearly for regular physical exams or for any other acute concern    Meds & Refills for this Visit:  Requested Prescriptions      No prescriptions requested or ordered in this encounter       Health Maintenance:  Health Maintenance Due   Topic Date Due    COVID-19 Vaccine (3 - 2024-25 season) 09/01/2024    Influenza Vaccine (1) Never done    Annual Physical  03/05/2025       Patient/Caregiver Education: Patient/Caregiver Education: There are no barriers to learning. Medical education done.   Outcome: Patient verbalizes understanding. Patient is notified to call with any questions, complications, allergies, or worsening or changing symptoms.  Patient is to call with any side effects or complications from the treatments as a result of today.     Problem List:  Patient Active Problem List   Diagnosis    Gastrointestinal hemorrhage with hematemesis    History of gastric ulcer    Gastroesophageal reflux disease without esophagitis         No follow-ups on file.     Zhen Arora MD    Please note that portions of this note may have been completed with a voice recognition program. Efforts were made to edit the dictations but occasionally words are mis-transcribed.       [1] No Known Allergies

## 2025-03-11 DIAGNOSIS — E78.00 ELEVATED CHOLESTEROL: Primary | ICD-10-CM

## (undated) DIAGNOSIS — R07.9 EXERTIONAL CHEST PAIN: Primary | ICD-10-CM

## (undated) DEVICE — 3M™ RED DOT™ MONITORING ELECTRODE WITH FOAM TAPE AND STICKY GEL, 50/BAG, 20/CASE, 72/PLT 2570: Brand: RED DOT™

## (undated) DEVICE — FORCEP RADIAL JAW 4

## (undated) DEVICE — CATH GOLD PROBE HEMOGLIDE 7FR

## (undated) DEVICE — 1200CC GUARDIAN II: Brand: GUARDIAN

## (undated) DEVICE — ENDOSCOPY PACK UPPER: Brand: MEDLINE INDUSTRIES, INC.

## (undated) DEVICE — Device: Brand: DEFENDO AIR/WATER/SUCTION AND BIOPSY VALVE

## (undated) DEVICE — FILTERLINE NASAL ADULT O2/CO2

## (undated) NOTE — LETTER
Marylou Youssef 89  [address] [facility]  Authorization for Surgical Operation and Procedure     Date:___________                                                                                                     Time:__________  I panda hemolytic reactions, transmission of diseases such as Hepatitis, AIDS and Cytomegalovirus (CMV) and fluid overload. In the event that I wish to have an autologous transfusion of my own blood, or a directed donor transfusion.   I will discuss this with my p purposes of reinstating the DNAR order. 10. Patients having a sterilization procedure: I understand that if the procedure is successful the results will be permanent and it will therefore be impossible for me to inseminate, conceive, or bear children.   I procedures as necessary. Some examples are: Starting or using an “IV” to give me medicine, fluids or blood during my procedure, and having a breathing tube placed to help me breathe when I’m asleep (intubation).  In the event that my heart stops working pro include headache, bleeding, infection, seizure, irregular heart rhythms, and nerve injury.     I can change my mind about having anesthesia services at any time before I get the medicine.    __________________________________________________________________

## (undated) NOTE — LETTER
03/20/20    To Whom It May Concern:    Nhung Bertrand reports he was ill this week but now reports that his symptoms have completely resolved so he would like to go back to work.   Since his symptoms have reportedly resolved and because he had no Covid